# Patient Record
Sex: MALE | Race: OTHER | HISPANIC OR LATINO | URBAN - METROPOLITAN AREA
[De-identification: names, ages, dates, MRNs, and addresses within clinical notes are randomized per-mention and may not be internally consistent; named-entity substitution may affect disease eponyms.]

---

## 2020-03-13 ENCOUNTER — EMERGENCY (EMERGENCY)
Facility: HOSPITAL | Age: 20
LOS: 1 days | Discharge: HOME | End: 2020-03-13
Attending: EMERGENCY MEDICINE | Admitting: EMERGENCY MEDICINE
Payer: MEDICAID

## 2020-03-13 VITALS
TEMPERATURE: 98 F | OXYGEN SATURATION: 98 % | RESPIRATION RATE: 20 BRPM | DIASTOLIC BLOOD PRESSURE: 79 MMHG | HEART RATE: 89 BPM | SYSTOLIC BLOOD PRESSURE: 133 MMHG

## 2020-03-13 PROCEDURE — 99284 EMERGENCY DEPT VISIT MOD MDM: CPT

## 2020-03-13 NOTE — ED ADULT TRIAGE NOTE - CHIEF COMPLAINT QUOTE
"I am having pain to my right ankle x 2 days. pt is outpatient [facility]. pt denies injury, denies suicidal or homicidial thoughts, no visural or  hallucination

## 2020-03-14 VITALS
SYSTOLIC BLOOD PRESSURE: 133 MMHG | TEMPERATURE: 98 F | RESPIRATION RATE: 19 BRPM | DIASTOLIC BLOOD PRESSURE: 64 MMHG | HEART RATE: 80 BPM | OXYGEN SATURATION: 100 %

## 2020-03-14 LAB
BASOPHILS # BLD AUTO: 0.05 K/UL — SIGNIFICANT CHANGE UP (ref 0–0.2)
BASOPHILS NFR BLD AUTO: 0.5 % — SIGNIFICANT CHANGE UP (ref 0–2)
BLD GP AB SCN SERPL QL: NEGATIVE — SIGNIFICANT CHANGE UP
CK SERPL-CCNC: 138 U/L — SIGNIFICANT CHANGE UP (ref 30–200)
EOSINOPHIL # BLD AUTO: 0.32 K/UL — SIGNIFICANT CHANGE UP (ref 0–0.5)
EOSINOPHIL NFR BLD AUTO: 3.4 % — SIGNIFICANT CHANGE UP (ref 0–6)
HCT VFR BLD CALC: 37.7 % — LOW (ref 39–50)
HGB BLD-MCNC: 12.1 G/DL — LOW (ref 13–17)
IMM GRANULOCYTES NFR BLD AUTO: 0.2 % — SIGNIFICANT CHANGE UP (ref 0–1.5)
LITHIUM SERPL-MCNC: < 0.1 MMOL/L — LOW (ref 0.6–1.2)
LYMPHOCYTES # BLD AUTO: 3.03 K/UL — SIGNIFICANT CHANGE UP (ref 1–3.3)
LYMPHOCYTES # BLD AUTO: 32.5 % — SIGNIFICANT CHANGE UP (ref 13–44)
MCHC RBC-ENTMCNC: 26.2 PG — LOW (ref 27–34)
MCHC RBC-ENTMCNC: 32.1 % — SIGNIFICANT CHANGE UP (ref 32–36)
MCV RBC AUTO: 81.8 FL — SIGNIFICANT CHANGE UP (ref 80–100)
MONOCYTES # BLD AUTO: 0.54 K/UL — SIGNIFICANT CHANGE UP (ref 0–0.9)
MONOCYTES NFR BLD AUTO: 5.8 % — SIGNIFICANT CHANGE UP (ref 2–14)
NEUTROPHILS # BLD AUTO: 5.36 K/UL — SIGNIFICANT CHANGE UP (ref 1.8–7.4)
NEUTROPHILS NFR BLD AUTO: 57.6 % — SIGNIFICANT CHANGE UP (ref 43–77)
NRBC # FLD: 0 K/UL — SIGNIFICANT CHANGE UP (ref 0–0)
PLATELET # BLD AUTO: 327 K/UL — SIGNIFICANT CHANGE UP (ref 150–400)
PMV BLD: 9.8 FL — SIGNIFICANT CHANGE UP (ref 7–13)
RBC # BLD: 4.61 M/UL — SIGNIFICANT CHANGE UP (ref 4.2–5.8)
RBC # FLD: 15.2 % — HIGH (ref 10.3–14.5)
RH IG SCN BLD-IMP: POSITIVE — SIGNIFICANT CHANGE UP
TSH SERPL-MCNC: 3.58 UIU/ML — SIGNIFICANT CHANGE UP (ref 0.27–4.2)
WBC # BLD: 9.32 K/UL — SIGNIFICANT CHANGE UP (ref 3.8–10.5)
WBC # FLD AUTO: 9.32 K/UL — SIGNIFICANT CHANGE UP (ref 3.8–10.5)

## 2020-03-14 PROCEDURE — 73564 X-RAY EXAM KNEE 4 OR MORE: CPT | Mod: 26,RT

## 2020-03-14 PROCEDURE — 73590 X-RAY EXAM OF LOWER LEG: CPT | Mod: 26,RT

## 2020-03-14 PROCEDURE — 73610 X-RAY EXAM OF ANKLE: CPT | Mod: 26,RT

## 2020-03-14 PROCEDURE — 93971 EXTREMITY STUDY: CPT | Mod: 26,RT

## 2020-03-14 RX ORDER — KETOROLAC TROMETHAMINE 30 MG/ML
15 SYRINGE (ML) INJECTION ONCE
Refills: 0 | Status: DISCONTINUED | OUTPATIENT
Start: 2020-03-14 | End: 2020-03-14

## 2020-03-14 RX ORDER — IBUPROFEN 200 MG
1 TABLET ORAL
Qty: 20 | Refills: 0
Start: 2020-03-14 | End: 2020-03-18

## 2020-03-14 RX ORDER — OXYCODONE AND ACETAMINOPHEN 5; 325 MG/1; MG/1
1 TABLET ORAL ONCE
Refills: 0 | Status: DISCONTINUED | OUTPATIENT
Start: 2020-03-14 | End: 2020-03-14

## 2020-03-14 RX ADMIN — OXYCODONE AND ACETAMINOPHEN 1 TABLET(S): 5; 325 TABLET ORAL at 06:06

## 2020-03-14 RX ADMIN — Medication 15 MILLIGRAM(S): at 03:10

## 2020-03-14 RX ADMIN — Medication 15 MILLIGRAM(S): at 02:40

## 2020-03-14 RX ADMIN — OXYCODONE AND ACETAMINOPHEN 1 TABLET(S): 5; 325 TABLET ORAL at 04:59

## 2020-03-14 NOTE — ED PROVIDER NOTE - NSFOLLOWUPINSTRUCTIONS_ED_ALL_ED_FT
Please follow up with your primary care doctor after you leave the emergency department so that they can follow up and conduct more testing and treatment as they deem necessary. If you have worsening signs or symptoms of what you came in to the Emergency Department today and are not able to see your doctor, go to your nearest emergency department or return to the [facility] emergency department for further care and management.

## 2020-03-14 NOTE — ED ADULT NURSE REASSESSMENT NOTE - NS ED NURSE REASSESS COMMENT FT1
right ankle wrapped in ace bandage by MD Renee, and instructed on use of crutches. pt assisted to waiting room in wheelchair awaiting taxi back to residence.

## 2020-03-14 NOTE — ED ADULT NURSE REASSESSMENT NOTE - NS ED NURSE REASSESS COMMENT FT1
pt to be discharged. MD Renee obtained collateral to Jennifer at [facility] on outpt [place]. transport by ambulette to be arranged. pt to be discharged. MD Renee obtained collateral to Jennifer at [facility] on outpt [place]. transport to be arranged.

## 2020-03-14 NOTE — ED PROVIDER NOTE - PROGRESS NOTE DETAILS
ANGELES:  Patient still with pain, significant tenderness anterior leg on exam.  Will c/s ortho for compartment syndrome. BRIDGETTE:  Ortho consultant states that if no fracture, consult should be taken by vascular resident.  Case discussed with vascular surgery resident who will see patient. MUNIRA: I was signed out this pt pending Surgery consult and recs for r/o compartment syndrome and they do not think pt has compartment syndrome and they do not think pt requires any surgical intervention. Re-interview of pt, he appears well but does complaint of RLE pain, gradual onset since monday leading him to "hop" around due to pain, not improved with ice so came to ED, no pain meds taken for this. Was given toradol in ED, no improvement. Re-examination of leg shows no edema, no erythema, DP/PT pulses normal but tenderness to palpation in tib and calf on RLE, guarding, not allowing full exam. Skin warm but not HOT to touch. Will provide more PO pain meds and reassess. MUNIRA: Pt pain improved. wants to be discharged back to [facility]. Spoke to Jennifer at [facility], will discharge pt and send back via Cab. They are aware. Pt will be given Rx for motrin 400 mg Q6-8H PRN. pt aware to need to f/u with PMD in next few days. Given crutches and taught how to use them. Pt was offered admission but refused. wanted to go home. Return precautions explained.

## 2020-03-14 NOTE — ED PROVIDER NOTE - OBJECTIVE STATEMENT
20 yo M presents from [facility] with right leg pain.  Patient states pain is in anterior chin, started 5 days ago, worsening, made worse by weight bearing and walking.  Denies trauma or strenuous exercise.  Denies numbness, tingling, weakness, coldness.  Not taking anything for pain.  No other complaints.  No recent travel.  No history of dvt/pe.

## 2020-03-14 NOTE — ED ADULT NURSE NOTE - NSIMPLEMENTINTERV_GEN_ALL_ED
Implemented All Fall with Harm Risk Interventions:  [place] to call system. Call bell, personal items and telephone within reach. Instruct patient to call for assistance. Room bathroom lighting operational. Non-slip footwear when patient is off stretcher. Physically safe environment: no spills, clutter or unnecessary equipment. Stretcher in lowest position, wheels locked, appropriate side rails in place. Provide visual cue, wrist band, yellow gown, etc. Monitor gait and stability. Monitor for mental status changes and reorient to person, place, and time. Review medications for side effects contributing to fall risk. Reinforce activity limits and safety measures with patient and family. Provide visual clues: red socks.

## 2020-03-14 NOTE — ED PROVIDER NOTE - PATIENT PORTAL LINK FT
You can access the FollowMyHealth Patient Portal offered by [facility] by registering at the following website: [email or web address]. By joining [facility]’s FollowMyHealth portal, you will also be able to view your health information using other applications (apps) compatible with our system.

## 2020-03-14 NOTE — ED PROVIDER NOTE - PHYSICAL EXAMINATION
RLE:  No gross deformity or swelling.  Mildly tender knee and ankle.  Significant tenderness at anterior leg and thigh, worse when squeezing.  Able to range knee.  Limited ROM at ankle.  Can wiggle toes.  Pain with passive stretch at ankle and foot.  DP and PT pulses 2+.  Sensation grossly intact.  Foot is warm.

## 2020-03-14 NOTE — ED PROVIDER NOTE - SHIFT CHANGE DETAILS
BRIDGETTE:  Patient signed out to Dr. Renee pending surgery consult for evaluation of suspected compartment syndrome.  RLE of patient is neurovascularly intact, but patient with persistent pain.  HD stable at time of sign out.

## 2020-03-14 NOTE — ED ADULT NURSE REASSESSMENT NOTE - NS ED NURSE REASSESS COMMENT FT1
pt received from intake area, A&Ox4 calm and cooperative, endorses RLE pain. pt states he is unable to bear weight. pt seen by surgery, awaiting disposition.

## 2020-03-14 NOTE — CONSULT NOTE ADULT - ASSESSMENT
18 yo man with a hx of bipolar disorder, schizophrenia, seizure disorder, and anxiety presenting with a 6-day history of worsening RLE pain. Surgery consulted to rule out compartment syndrome. RLE compartments soft, tenderness to palpation of the right leg and right ankle, peripheral pulses intact. Labs including CK wnl. Plain films do not reveal fracture; LE duplex negative for DVT.    - low clinical suspicion for compartment syndrome  - no general surgical intervention indicated at this time  - further workup at discretion of ED    B Team Surgery  [id number] 18 yo man with a hx of bipolar disorder, schizophrenia, seizure disorder, and anxiety presenting with a 6-day history of worsening RLE pain. Surgery consulted to rule out compartment syndrome. RLE compartments soft, tenderness to palpation of the right leg and right ankle, peripheral pulses intact. Sensation and motor intact. Labs including CK wnl. Plain films do not reveal fracture; LE duplex negative for DVT.    - low clinical suspicion for compartment syndrome  - no general surgical intervention indicated at this time  - further workup at discretion of ED    B Team Surgery  [id number]

## 2020-03-14 NOTE — ED PROVIDER NOTE - CLINICAL SUMMARY MEDICAL DECISION MAKING FREE TEXT BOX
18 yo M presents from [facility] with right leg pain x5 days, atraumatic.  Exam non-toxic appearing, RLE neurovascularly intact but with significant tenderness in anterior leg and pain with passive stretch of distal appendage.  Low c/f septic joint.  Low c/f acute fracture given history.  Lower c/f dvt given lack of swelling and pain is presenting symptom.  Compartment syndrome considered, but no clear inciting event.  Will send labs, x-rays, duplex, pain control.   Dispo pending.

## 2020-03-16 ENCOUNTER — INPATIENT (INPATIENT)
Facility: HOSPITAL | Age: 20
LOS: 14 days | Discharge: HOME | End: 2020-03-31
Attending: PSYCHIATRY & NEUROLOGY | Admitting: PSYCHIATRY & NEUROLOGY
Payer: MEDICAID

## 2020-03-16 VITALS
HEART RATE: 93 BPM | DIASTOLIC BLOOD PRESSURE: 86 MMHG | RESPIRATION RATE: 18 BRPM | TEMPERATURE: 98 F | SYSTOLIC BLOOD PRESSURE: 137 MMHG | OXYGEN SATURATION: 98 %

## 2020-03-16 DIAGNOSIS — F25.1 SCHIZOAFFECTIVE DISORDER, DEPRESSIVE TYPE: ICD-10-CM

## 2020-03-16 LAB
ALBUMIN SERPL ELPH-MCNC: 4.6 G/DL — SIGNIFICANT CHANGE UP (ref 3.3–5)
ALP SERPL-CCNC: 86 U/L — SIGNIFICANT CHANGE UP (ref 40–120)
ALT FLD-CCNC: 53 U/L — HIGH (ref 4–41)
AMPHET UR-MCNC: NEGATIVE — SIGNIFICANT CHANGE UP
ANION GAP SERPL CALC-SCNC: 17 MMO/L — HIGH (ref 7–14)
APAP SERPL-MCNC: < 15 UG/ML — LOW (ref 15–25)
APPEARANCE UR: CLEAR — SIGNIFICANT CHANGE UP
AST SERPL-CCNC: 33 U/L — SIGNIFICANT CHANGE UP (ref 4–40)
BACTERIA # UR AUTO: NEGATIVE — SIGNIFICANT CHANGE UP
BARBITURATES UR SCN-MCNC: NEGATIVE — SIGNIFICANT CHANGE UP
BASOPHILS # BLD AUTO: 0.04 K/UL — SIGNIFICANT CHANGE UP (ref 0–0.2)
BASOPHILS NFR BLD AUTO: 0.4 % — SIGNIFICANT CHANGE UP (ref 0–2)
BENZODIAZ UR-MCNC: NEGATIVE — SIGNIFICANT CHANGE UP
BILIRUB SERPL-MCNC: 0.4 MG/DL — SIGNIFICANT CHANGE UP (ref 0.2–1.2)
BILIRUB UR-MCNC: NEGATIVE — SIGNIFICANT CHANGE UP
BLOOD UR QL VISUAL: NEGATIVE — SIGNIFICANT CHANGE UP
BUN SERPL-MCNC: 6 MG/DL — LOW (ref 7–23)
CALCIUM SERPL-MCNC: 10.1 MG/DL — SIGNIFICANT CHANGE UP (ref 8.4–10.5)
CANNABINOIDS UR-MCNC: NEGATIVE — SIGNIFICANT CHANGE UP
CHLORIDE SERPL-SCNC: 100 MMOL/L — SIGNIFICANT CHANGE UP (ref 98–107)
CO2 SERPL-SCNC: 23 MMOL/L — SIGNIFICANT CHANGE UP (ref 22–31)
COCAINE METAB.OTHER UR-MCNC: NEGATIVE — SIGNIFICANT CHANGE UP
COLOR SPEC: YELLOW — SIGNIFICANT CHANGE UP
CREAT SERPL-MCNC: 0.73 MG/DL — SIGNIFICANT CHANGE UP (ref 0.5–1.3)
EOSINOPHIL # BLD AUTO: 0.13 K/UL — SIGNIFICANT CHANGE UP (ref 0–0.5)
EOSINOPHIL NFR BLD AUTO: 1.4 % — SIGNIFICANT CHANGE UP (ref 0–6)
ETHANOL BLD-MCNC: < 10 MG/DL — SIGNIFICANT CHANGE UP
GLUCOSE SERPL-MCNC: 79 MG/DL — SIGNIFICANT CHANGE UP (ref 70–99)
GLUCOSE UR-MCNC: NEGATIVE — SIGNIFICANT CHANGE UP
HCT VFR BLD CALC: 40.9 % — SIGNIFICANT CHANGE UP (ref 39–50)
HGB BLD-MCNC: 13.3 G/DL — SIGNIFICANT CHANGE UP (ref 13–17)
HYALINE CASTS # UR AUTO: SIGNIFICANT CHANGE UP
IMM GRANULOCYTES NFR BLD AUTO: 0.3 % — SIGNIFICANT CHANGE UP (ref 0–1.5)
KETONES UR-MCNC: HIGH
LEUKOCYTE ESTERASE UR-ACNC: NEGATIVE — SIGNIFICANT CHANGE UP
LITHIUM SERPL-MCNC: < 0.04 MMOL/L — LOW (ref 0.6–1.2)
LYMPHOCYTES # BLD AUTO: 2.17 K/UL — SIGNIFICANT CHANGE UP (ref 1–3.3)
LYMPHOCYTES # BLD AUTO: 23.8 % — SIGNIFICANT CHANGE UP (ref 13–44)
MCHC RBC-ENTMCNC: 26.6 PG — LOW (ref 27–34)
MCHC RBC-ENTMCNC: 32.5 % — SIGNIFICANT CHANGE UP (ref 32–36)
MCV RBC AUTO: 81.8 FL — SIGNIFICANT CHANGE UP (ref 80–100)
METHADONE UR-MCNC: NEGATIVE — SIGNIFICANT CHANGE UP
MONOCYTES # BLD AUTO: 0.45 K/UL — SIGNIFICANT CHANGE UP (ref 0–0.9)
MONOCYTES NFR BLD AUTO: 4.9 % — SIGNIFICANT CHANGE UP (ref 2–14)
NEUTROPHILS # BLD AUTO: 6.31 K/UL — SIGNIFICANT CHANGE UP (ref 1.8–7.4)
NEUTROPHILS NFR BLD AUTO: 69.2 % — SIGNIFICANT CHANGE UP (ref 43–77)
NITRITE UR-MCNC: NEGATIVE — SIGNIFICANT CHANGE UP
NRBC # FLD: 0 K/UL — SIGNIFICANT CHANGE UP (ref 0–0)
OPIATES UR-MCNC: NEGATIVE — SIGNIFICANT CHANGE UP
OXYCODONE UR-MCNC: NEGATIVE — SIGNIFICANT CHANGE UP
PCP UR-MCNC: NEGATIVE — SIGNIFICANT CHANGE UP
PH UR: 7.5 — SIGNIFICANT CHANGE UP (ref 5–8)
PLATELET # BLD AUTO: 286 K/UL — SIGNIFICANT CHANGE UP (ref 150–400)
PMV BLD: 10.1 FL — SIGNIFICANT CHANGE UP (ref 7–13)
POTASSIUM SERPL-MCNC: 3.8 MMOL/L — SIGNIFICANT CHANGE UP (ref 3.5–5.3)
POTASSIUM SERPL-SCNC: 3.8 MMOL/L — SIGNIFICANT CHANGE UP (ref 3.5–5.3)
PROT SERPL-MCNC: 8.2 G/DL — SIGNIFICANT CHANGE UP (ref 6–8.3)
PROT UR-MCNC: 30 — SIGNIFICANT CHANGE UP
RBC # BLD: 5 M/UL — SIGNIFICANT CHANGE UP (ref 4.2–5.8)
RBC # FLD: 14.8 % — HIGH (ref 10.3–14.5)
RBC CASTS # UR COMP ASSIST: SIGNIFICANT CHANGE UP (ref 0–?)
SALICYLATES SERPL-MCNC: < 5 MG/DL — LOW (ref 15–30)
SODIUM SERPL-SCNC: 140 MMOL/L — SIGNIFICANT CHANGE UP (ref 135–145)
SP GR SPEC: 1.02 — SIGNIFICANT CHANGE UP (ref 1–1.04)
SQUAMOUS # UR AUTO: SIGNIFICANT CHANGE UP
TSH SERPL-MCNC: 1.27 UIU/ML — SIGNIFICANT CHANGE UP (ref 0.27–4.2)
UROBILINOGEN FLD QL: SIGNIFICANT CHANGE UP
WBC # BLD: 9.13 K/UL — SIGNIFICANT CHANGE UP (ref 3.8–10.5)
WBC # FLD AUTO: 9.13 K/UL — SIGNIFICANT CHANGE UP (ref 3.8–10.5)
WBC UR QL: SIGNIFICANT CHANGE UP (ref 0–?)

## 2020-03-16 PROCEDURE — 99285 EMERGENCY DEPT VISIT HI MDM: CPT

## 2020-03-16 RX ORDER — HALOPERIDOL DECANOATE 100 MG/ML
5 INJECTION INTRAMUSCULAR ONCE
Refills: 0 | Status: DISCONTINUED | OUTPATIENT
Start: 2020-03-16 | End: 2020-03-31

## 2020-03-16 RX ORDER — ACETAMINOPHEN 500 MG
650 TABLET ORAL ONCE
Refills: 0 | Status: COMPLETED | OUTPATIENT
Start: 2020-03-16 | End: 2020-03-16

## 2020-03-16 RX ORDER — FLUOXETINE HCL 10 MG
20 CAPSULE ORAL DAILY
Refills: 0 | Status: DISCONTINUED | OUTPATIENT
Start: 2020-03-17 | End: 2020-03-18

## 2020-03-16 RX ORDER — CLOZAPINE 150 MG/1
25 TABLET, ORALLY DISINTEGRATING ORAL AT BEDTIME
Refills: 0 | Status: DISCONTINUED | OUTPATIENT
Start: 2020-03-16 | End: 2020-03-19

## 2020-03-16 RX ORDER — LITHIUM CARBONATE 300 MG/1
300 TABLET, EXTENDED RELEASE ORAL
Refills: 0 | Status: DISCONTINUED | OUTPATIENT
Start: 2020-03-16 | End: 2020-03-18

## 2020-03-16 RX ORDER — HALOPERIDOL DECANOATE 100 MG/ML
5 INJECTION INTRAMUSCULAR EVERY 6 HOURS
Refills: 0 | Status: DISCONTINUED | OUTPATIENT
Start: 2020-03-16 | End: 2020-03-31

## 2020-03-16 RX ORDER — ACETAMINOPHEN 500 MG
650 TABLET ORAL EVERY 6 HOURS
Refills: 0 | Status: DISCONTINUED | OUTPATIENT
Start: 2020-03-16 | End: 2020-03-31

## 2020-03-16 RX ORDER — MAGNESIUM HYDROXIDE 400 MG/1
30 TABLET, CHEWABLE ORAL DAILY
Refills: 0 | Status: DISCONTINUED | OUTPATIENT
Start: 2020-03-16 | End: 2020-03-31

## 2020-03-16 RX ORDER — ALBUTEROL 90 UG/1
2 AEROSOL, METERED ORAL EVERY 6 HOURS
Refills: 0 | Status: DISCONTINUED | OUTPATIENT
Start: 2020-03-16 | End: 2020-03-31

## 2020-03-16 RX ORDER — SENNA PLUS 8.6 MG/1
2 TABLET ORAL AT BEDTIME
Refills: 0 | Status: DISCONTINUED | OUTPATIENT
Start: 2020-03-16 | End: 2020-03-31

## 2020-03-16 RX ADMIN — SENNA PLUS 2 TABLET(S): 8.6 TABLET ORAL at 20:43

## 2020-03-16 RX ADMIN — CLOZAPINE 25 MILLIGRAM(S): 150 TABLET, ORALLY DISINTEGRATING ORAL at 20:43

## 2020-03-16 RX ADMIN — LITHIUM CARBONATE 300 MILLIGRAM(S): 300 TABLET, EXTENDED RELEASE ORAL at 20:43

## 2020-03-16 RX ADMIN — Medication 650 MILLIGRAM(S): at 19:48

## 2020-03-16 NOTE — ED PROVIDER NOTE - PRIOR HOSPITAL/ED VISITS SUMMARY FREE TEXT FOR MDM OBTAINED AND REVIEWED OLD RECORDS QUESTION
Reviewed chart ed visit from 3/14 with sudden onset right ankle pain, - ankle, knee xray and US right leg all with negative results.  Pt arrived with an ACE band ot right leg. He states he was here 2 days ago for right ankle pain and he was discharged with negative results.
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
## 2020-03-16 NOTE — ED ADULT NURSE NOTE - OBJECTIVE STATEMENT
received to  area. c/o depression and hi with voices telling him to electrocute himself x 1 week. states has been non-compliant with meds x a few months. also c/o right ankle pain. denies hearing any voices now. labs sent. awaits psych eval in [facility].

## 2020-03-16 NOTE — ED BEHAVIORAL HEALTH ASSESSMENT NOTE - SUICIDE RISK FACTORS
Hopelessness or despair/Current mood episode/Command hallucinations/Unable to engage in safety planning

## 2020-03-16 NOTE — ED BEHAVIORAL HEALTH ASSESSMENT NOTE - RISK ASSESSMENT
High Acute Suicide Risk high risk High acute risk with risk factors of depressed mood, hopelessness, acute suicidal ideation with plan with noted past history of suicide attempts and past psychiatric hospitalizations.  Precipitating factors are recent non-adherance to medications since discharge with noted unstable living environment.  Protective factors are the fact patient is on a long acting injectable and is exhibiting help seeking behavior, voluntarily seeking inpatient treatment.

## 2020-03-16 NOTE — ED BEHAVIORAL HEALTH ASSESSMENT NOTE - OTHER
[facility] on [place] [address] peers at respite records from [facility] discharge summary Missy forde on 3/6/20 in bed [id number]

## 2020-03-16 NOTE — ED BEHAVIORAL HEALTH ASSESSMENT NOTE - CURRENT MEDICATION
Aristada 1064mg IM q 2 months (last given 2/28/2020, next dose due 4/24/20), Non compliant with PO meds listed on d/c 3/6/20 as fluoxetine 60mg daily, lithium carbonate 900mg HS, clozapine 150mg HS, docusate 100mg BID, Senna 2 tabs HS

## 2020-03-16 NOTE — ED BEHAVIORAL HEALTH ASSESSMENT NOTE - PRIMARY DX
Schizoaffective disorder, depressive type Schizoaffective disorder, unspecified type Deferred condition on axis II

## 2020-03-16 NOTE — ED PROVIDER NOTE - NS ED ROS FT
ROS  	•	CONSTITUTIONAL - no fever, no diaphoresis, no weight change  	•	SKIN - no rash  	•	HEMATOLOGIC - no bleeding, no bruising  	•	EYES - no eye pain, no blurred vision  	•	ENT - no change in hearing, no pain  	•	RESPIRATORY - no shortness of breath, no cough  	•	CARDIAC - no chest pain, no palpitations  	•	GI - no abd pain, no nausea, no vomiting, no diarrhea, no constipation, no bleeding  	•	GENITO-URINARY - no frequent urination, no urgency, no dysuria; no hematuria,    	•	ENDO - no polydypsia, no polyurea, no heat/no cold intolerance  	•	MUSCULOSKELETAL - +right ankle pain, unable to bear weight   	•	NEUROLOGIC - no weakness, no headache, no anesthesia, no paresthesias  	•	PSYCH - + anxiety, +depression +suicidal,+ AH,  no homicidal,

## 2020-03-16 NOTE — ED BEHAVIORAL HEALTH ASSESSMENT NOTE - PSYCHIATRIC ISSUES AND PLAN (INCLUDE STANDING AND PRN MEDICATION)
restart on clozapine 25mg HS and suggest titration to treat psychosis, prozac 20mg in AM for depression, haldol 5mg PO/IM prn agitation, ativan 1mg PO/IM prn anxiety/agitation restart on clozapine 25mg HS and suggest titration to treat psychosis, lithium 300mg BID for mood stabilization, prozac 20mg in AM for depression, haldol 5mg PO/IM prn agitation, ativan 1mg PO q6hr prn anxiety and ativan 2mg IM prn agitation

## 2020-03-16 NOTE — ED PROVIDER NOTE - CLINICAL SUMMARY MEDICAL DECISION MAKING FREE TEXT BOX
This is a 19 yr old M, pmh bipolar disorder with c/o SI with a plan. Blood work, ua tox serum, ekg - results unremarkable. Psych consult - recommendation inpatient treatment. Medication offered and accepted for right ankle pain. Recommendation ambulate with assist may use walker as needed and PRN motrin or tylenol for pain management.
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
## 2020-03-16 NOTE — ED BEHAVIORAL HEALTH ASSESSMENT NOTE - MEDICAL ISSUES AND PLAN (INCLUDE STANDING AND PRN MEDICATION)
medically cleared - ambulate with walker as uses crutches at home, tylenol prn pain, hgb A1c and lipid profile in AM medically cleared - ambulate with walker as uses crutches at home, tylenol prn pain, hgb A1c and lipid profile in AM, albuterol prn SOB/wheezing for asthma, senna 2 tablets HS for constipation.  MOM prn constipation and MVI daily for supplementation.

## 2020-03-16 NOTE — ED BEHAVIORAL HEALTH ASSESSMENT NOTE - ADDITIONAL DETAILS ALL
acute SI with plan to electrocute self, past overdose 1 year ago and self aborted attempt of cutting neck with knife that lead to [place] 2/21/20 admission.

## 2020-03-16 NOTE — ED ADULT NURSE REASSESSMENT NOTE - NS ED NURSE REASSESS COMMENT FT1
Pt ambulating independently using crutches that he came in with. Currently being transported to [facility] with security & ED tech.

## 2020-03-16 NOTE — ED PROVIDER NOTE - OBJECTIVE STATEMENT
This is a 19 yr old M, [facility] This is a 19 yr old M, pmh bipolar disorder with c/o SI with a plan. Pt came from [place] [id number]. He states yesterday he had an episode when he wanted to hurt himself with a stapler in his room. He says today the voices telling him to electrocute himself. He endorses recent psychiatric hospitalization in [place] for SI. Pt arrived with an ACE band ot right leg. He states he was here 2 days ago for right ankle pain and he was discharged with negative results.  Denies falling, punching or kicking any objects. Denies pain, SOB, fever, chills, chest and abdominal discomfort. Denies recent use of alcohol or illicit drug. No evidence of physical injuries.
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
## 2020-03-16 NOTE — ED BEHAVIORAL HEALTH NOTE - BEHAVIORAL HEALTH NOTE
Pt is 19 year old male BIB EMS from [facility] for suicidal thoughts. Writer contacted [facility] at [phone] and spoke with , Dheeraj, who provided the following information:    Pt d/[facility] from [facility] to [facility] on 3/6/20. Pt's respite stay ends on 3/20/20. Pt reported to have hx of Schizoaffective disorder. Pt in last week has appeared in depressed state with increased sleep.   Pt today spoke with  and verbalized feeling depressed and wanting to harm himself with a stapler. Pt said voices are telling him plan. Pt then requested to go to hospital due to ideations. Medication list sent with pt. Pt received Aristada 1064MG IM q 2 months with next dose due 4/24/20 in addition to oral medications. IM medication reported to be new with pt reported to be still adjusting. Pt prior to most recent hospitalization had stopped medication ending in psych. hospitalization. Pt planning to go to shelter after respite stay due to stressors at home. Pt previously living with aunt.  [facility] does not manage or supervise medications. Current eating patterns unknown. This was first time pt reported SI to staff. Pt however reported that thoughts first started yesterday. Pt has no access to firearms. No observed response to internal stimuli was reported. Pt has no hx of aggression or violence. No substance use reported. Pt sent from respite program to ED the other day for leg pain and d/[facility]ivier with dx of Arthritis. NO HI intent or plan reported. Pt is 19 year old male BIB EMS from [facility] for suicidal thoughts. Writer contacted [facility] at [phone] and spoke with , Dheeraj, who provided the following information:    Pt d/[facility] from [facility] to [facility] on 3/6/20. Pt's respite stay ends on 3/20/20. Pt reported to have hx of Schizoaffective disorder. Pt in last week has appeared in depressed state with increased sleep.   Pt today spoke with  and verbalized feeling depressed and wanting to harm himself with a stapler. Pt said voices are telling him plan. Pt then requested to go to hospital due to ideations. Medication list sent with pt. Pt received Aristada 1064MG IM q 2 months with next dose due 4/24/20 in addition to oral medications. IM medication reported to be new with pt reported to be still adjusting. Pt prior to most recent hospitalization had stopped medication ending in psych. hospitalization. Pt planning to go to shelter after respite stay due to stressors at home. Pt previously living with aunt.  [facility] does not manage or supervise medications. Current eating patterns unknown. This was first time pt reported SI to staff. Pt however reported that thoughts first started yesterday. Pt has no access to firearms. No observed response to internal stimuli was reported. Pt has no hx of aggression or violence. No substance use reported. Pt sent from [facility] program to ED the other day for leg pain and d/[facility] with dx of Arthritis. NO HI intent or plan reported.    [facility].

## 2020-03-16 NOTE — ED BEHAVIORAL HEALTH ASSESSMENT NOTE - DESCRIPTION
calm, cooperative, no agitation, in behavioral control, no prns    Vital Signs Last 24 Hrs  T(C): 36.7 (16 Mar 2020 15:54), Max: 36.7 (16 Mar 2020 15:54)  T(F): 98 (16 Mar 2020 15:54), Max: 98 (16 Mar 2020 15:54)  HR: 93 (16 Mar 2020 15:54) (93 - 93)  BP: 137/86 (16 Mar 2020 15:54) (137/86 - 137/86)  BP(mean): --  RR: 18 (16 Mar 2020 15:54) (18 - 18)  SpO2: 98% (16 Mar 2020 15:54) (98% - 98%) right ankle pain, asthma, prediabetic, obesity see HPI
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
## 2020-03-17 DIAGNOSIS — R69 ILLNESS, UNSPECIFIED: ICD-10-CM

## 2020-03-17 DIAGNOSIS — F25.9 SCHIZOAFFECTIVE DISORDER, UNSPECIFIED: ICD-10-CM

## 2020-03-17 LAB
CHOLEST SERPL-MCNC: 173 MG/DL — SIGNIFICANT CHANGE UP (ref 120–199)
HBA1C BLD-MCNC: 4.7 % — SIGNIFICANT CHANGE UP (ref 4–5.6)
HDLC SERPL-MCNC: 21 MG/DL — LOW (ref 35–55)
LIPID PNL WITH DIRECT LDL SERPL: 119 MG/DL — SIGNIFICANT CHANGE UP
TRIGL SERPL-MCNC: 196 MG/DL — HIGH (ref 10–149)

## 2020-03-17 PROCEDURE — 99222 1ST HOSP IP/OBS MODERATE 55: CPT

## 2020-03-17 RX ADMIN — Medication 20 MILLIGRAM(S): at 08:30

## 2020-03-17 RX ADMIN — Medication 1 TABLET(S): at 08:30

## 2020-03-17 RX ADMIN — LITHIUM CARBONATE 300 MILLIGRAM(S): 300 TABLET, EXTENDED RELEASE ORAL at 20:29

## 2020-03-17 RX ADMIN — CLOZAPINE 25 MILLIGRAM(S): 150 TABLET, ORALLY DISINTEGRATING ORAL at 20:29

## 2020-03-17 RX ADMIN — SENNA PLUS 2 TABLET(S): 8.6 TABLET ORAL at 20:29

## 2020-03-17 RX ADMIN — LITHIUM CARBONATE 300 MILLIGRAM(S): 300 TABLET, EXTENDED RELEASE ORAL at 08:30

## 2020-03-18 PROCEDURE — 99232 SBSQ HOSP IP/OBS MODERATE 35: CPT

## 2020-03-18 RX ADMIN — SENNA PLUS 2 TABLET(S): 8.6 TABLET ORAL at 20:42

## 2020-03-18 RX ADMIN — Medication 20 MILLIGRAM(S): at 08:38

## 2020-03-18 RX ADMIN — Medication 1 TABLET(S): at 08:39

## 2020-03-18 RX ADMIN — CLOZAPINE 25 MILLIGRAM(S): 150 TABLET, ORALLY DISINTEGRATING ORAL at 20:42

## 2020-03-18 RX ADMIN — LITHIUM CARBONATE 300 MILLIGRAM(S): 300 TABLET, EXTENDED RELEASE ORAL at 08:38

## 2020-03-19 PROCEDURE — 99232 SBSQ HOSP IP/OBS MODERATE 35: CPT

## 2020-03-19 RX ORDER — CLOZAPINE 150 MG/1
50 TABLET, ORALLY DISINTEGRATING ORAL AT BEDTIME
Refills: 0 | Status: DISCONTINUED | OUTPATIENT
Start: 2020-03-19 | End: 2020-03-21

## 2020-03-19 RX ADMIN — CLOZAPINE 50 MILLIGRAM(S): 150 TABLET, ORALLY DISINTEGRATING ORAL at 20:31

## 2020-03-19 RX ADMIN — SENNA PLUS 2 TABLET(S): 8.6 TABLET ORAL at 20:31

## 2020-03-20 PROCEDURE — 99232 SBSQ HOSP IP/OBS MODERATE 35: CPT

## 2020-03-20 RX ADMIN — CLOZAPINE 50 MILLIGRAM(S): 150 TABLET, ORALLY DISINTEGRATING ORAL at 21:31

## 2020-03-20 RX ADMIN — SENNA PLUS 2 TABLET(S): 8.6 TABLET ORAL at 20:14

## 2020-03-20 RX ADMIN — Medication 1 TABLET(S): at 12:00

## 2020-03-21 PROCEDURE — 99231 SBSQ HOSP IP/OBS SF/LOW 25: CPT

## 2020-03-21 RX ORDER — CLOZAPINE 150 MG/1
75 TABLET, ORALLY DISINTEGRATING ORAL AT BEDTIME
Refills: 0 | Status: DISCONTINUED | OUTPATIENT
Start: 2020-03-21 | End: 2020-03-24

## 2020-03-21 RX ADMIN — CLOZAPINE 75 MILLIGRAM(S): 150 TABLET, ORALLY DISINTEGRATING ORAL at 20:28

## 2020-03-21 RX ADMIN — Medication 1 MILLIGRAM(S): at 18:30

## 2020-03-21 RX ADMIN — SENNA PLUS 2 TABLET(S): 8.6 TABLET ORAL at 20:28

## 2020-03-21 RX ADMIN — Medication 1 TABLET(S): at 12:32

## 2020-03-21 RX ADMIN — Medication 650 MILLIGRAM(S): at 13:47

## 2020-03-21 RX ADMIN — HALOPERIDOL DECANOATE 5 MILLIGRAM(S): 100 INJECTION INTRAMUSCULAR at 18:30

## 2020-03-22 PROCEDURE — 99232 SBSQ HOSP IP/OBS MODERATE 35: CPT

## 2020-03-22 RX ADMIN — CLOZAPINE 75 MILLIGRAM(S): 150 TABLET, ORALLY DISINTEGRATING ORAL at 20:29

## 2020-03-22 RX ADMIN — Medication 1 TABLET(S): at 12:53

## 2020-03-22 RX ADMIN — SENNA PLUS 2 TABLET(S): 8.6 TABLET ORAL at 20:29

## 2020-03-23 PROCEDURE — 99231 SBSQ HOSP IP/OBS SF/LOW 25: CPT

## 2020-03-23 RX ORDER — BENZOCAINE AND MENTHOL 5; 1 G/100ML; G/100ML
1 LIQUID ORAL
Refills: 0 | Status: DISCONTINUED | OUTPATIENT
Start: 2020-03-23 | End: 2020-03-31

## 2020-03-23 RX ADMIN — Medication 1 TABLET(S): at 10:35

## 2020-03-23 RX ADMIN — BENZOCAINE AND MENTHOL 1 LOZENGE: 5; 1 LIQUID ORAL at 12:59

## 2020-03-23 RX ADMIN — CLOZAPINE 75 MILLIGRAM(S): 150 TABLET, ORALLY DISINTEGRATING ORAL at 20:27

## 2020-03-23 RX ADMIN — SENNA PLUS 2 TABLET(S): 8.6 TABLET ORAL at 20:27

## 2020-03-24 LAB
ALBUMIN SERPL ELPH-MCNC: 3.9 G/DL — SIGNIFICANT CHANGE UP (ref 3.3–5)
ALP SERPL-CCNC: 72 U/L — SIGNIFICANT CHANGE UP (ref 40–120)
ALT FLD-CCNC: 38 U/L — SIGNIFICANT CHANGE UP (ref 4–41)
ANION GAP SERPL CALC-SCNC: 12 MMO/L — SIGNIFICANT CHANGE UP (ref 7–14)
AST SERPL-CCNC: 23 U/L — SIGNIFICANT CHANGE UP (ref 4–40)
BASOPHILS # BLD AUTO: 0.03 K/UL — SIGNIFICANT CHANGE UP (ref 0–0.2)
BASOPHILS NFR BLD AUTO: 0.4 % — SIGNIFICANT CHANGE UP (ref 0–2)
BILIRUB SERPL-MCNC: 0.3 MG/DL — SIGNIFICANT CHANGE UP (ref 0.2–1.2)
BUN SERPL-MCNC: 8 MG/DL — SIGNIFICANT CHANGE UP (ref 7–23)
CALCIUM SERPL-MCNC: 9.6 MG/DL — SIGNIFICANT CHANGE UP (ref 8.4–10.5)
CHLORIDE SERPL-SCNC: 101 MMOL/L — SIGNIFICANT CHANGE UP (ref 98–107)
CO2 SERPL-SCNC: 25 MMOL/L — SIGNIFICANT CHANGE UP (ref 22–31)
CREAT SERPL-MCNC: 0.75 MG/DL — SIGNIFICANT CHANGE UP (ref 0.5–1.3)
EOSINOPHIL # BLD AUTO: 0.36 K/UL — SIGNIFICANT CHANGE UP (ref 0–0.5)
EOSINOPHIL NFR BLD AUTO: 4.2 % — SIGNIFICANT CHANGE UP (ref 0–6)
GLUCOSE SERPL-MCNC: 86 MG/DL — SIGNIFICANT CHANGE UP (ref 70–99)
HCT VFR BLD CALC: 39.4 % — SIGNIFICANT CHANGE UP (ref 39–50)
HGB BLD-MCNC: 12.1 G/DL — LOW (ref 13–17)
IMM GRANULOCYTES NFR BLD AUTO: 0.4 % — SIGNIFICANT CHANGE UP (ref 0–1.5)
LYMPHOCYTES # BLD AUTO: 1.58 K/UL — SIGNIFICANT CHANGE UP (ref 1–3.3)
LYMPHOCYTES # BLD AUTO: 18.4 % — SIGNIFICANT CHANGE UP (ref 13–44)
MCHC RBC-ENTMCNC: 25 PG — LOW (ref 27–34)
MCHC RBC-ENTMCNC: 30.7 % — LOW (ref 32–36)
MCV RBC AUTO: 81.4 FL — SIGNIFICANT CHANGE UP (ref 80–100)
MONOCYTES # BLD AUTO: 0.64 K/UL — SIGNIFICANT CHANGE UP (ref 0–0.9)
MONOCYTES NFR BLD AUTO: 7.5 % — SIGNIFICANT CHANGE UP (ref 2–14)
NEUTROPHILS # BLD AUTO: 5.93 K/UL — SIGNIFICANT CHANGE UP (ref 1.8–7.4)
NEUTROPHILS NFR BLD AUTO: 69.1 % — SIGNIFICANT CHANGE UP (ref 43–77)
NRBC # FLD: 0 K/UL — SIGNIFICANT CHANGE UP (ref 0–0)
PLATELET # BLD AUTO: 303 K/UL — SIGNIFICANT CHANGE UP (ref 150–400)
PMV BLD: 10.3 FL — SIGNIFICANT CHANGE UP (ref 7–13)
POTASSIUM SERPL-MCNC: 4.2 MMOL/L — SIGNIFICANT CHANGE UP (ref 3.5–5.3)
POTASSIUM SERPL-SCNC: 4.2 MMOL/L — SIGNIFICANT CHANGE UP (ref 3.5–5.3)
PROT SERPL-MCNC: 7.3 G/DL — SIGNIFICANT CHANGE UP (ref 6–8.3)
RBC # BLD: 4.84 M/UL — SIGNIFICANT CHANGE UP (ref 4.2–5.8)
RBC # FLD: 15.1 % — HIGH (ref 10.3–14.5)
SODIUM SERPL-SCNC: 138 MMOL/L — SIGNIFICANT CHANGE UP (ref 135–145)
WBC # BLD: 8.57 K/UL — SIGNIFICANT CHANGE UP (ref 3.8–10.5)
WBC # FLD AUTO: 8.57 K/UL — SIGNIFICANT CHANGE UP (ref 3.8–10.5)

## 2020-03-24 PROCEDURE — 99232 SBSQ HOSP IP/OBS MODERATE 35: CPT

## 2020-03-24 RX ORDER — CLOZAPINE 150 MG/1
100 TABLET, ORALLY DISINTEGRATING ORAL AT BEDTIME
Refills: 0 | Status: DISCONTINUED | OUTPATIENT
Start: 2020-03-24 | End: 2020-03-27

## 2020-03-24 RX ADMIN — HALOPERIDOL DECANOATE 5 MILLIGRAM(S): 100 INJECTION INTRAMUSCULAR at 15:57

## 2020-03-24 RX ADMIN — CLOZAPINE 100 MILLIGRAM(S): 150 TABLET, ORALLY DISINTEGRATING ORAL at 20:06

## 2020-03-24 RX ADMIN — BENZOCAINE AND MENTHOL 1 LOZENGE: 5; 1 LIQUID ORAL at 20:06

## 2020-03-24 RX ADMIN — Medication 1 TABLET(S): at 11:37

## 2020-03-24 RX ADMIN — SENNA PLUS 2 TABLET(S): 8.6 TABLET ORAL at 20:06

## 2020-03-25 PROCEDURE — 90853 GROUP PSYCHOTHERAPY: CPT

## 2020-03-25 PROCEDURE — 90832 PSYTX W PT 30 MINUTES: CPT | Mod: 59

## 2020-03-25 RX ADMIN — SENNA PLUS 2 TABLET(S): 8.6 TABLET ORAL at 20:36

## 2020-03-25 RX ADMIN — Medication 1 TABLET(S): at 08:35

## 2020-03-25 RX ADMIN — CLOZAPINE 100 MILLIGRAM(S): 150 TABLET, ORALLY DISINTEGRATING ORAL at 20:36

## 2020-03-26 PROCEDURE — 90832 PSYTX W PT 30 MINUTES: CPT

## 2020-03-26 RX ADMIN — Medication 1 TABLET(S): at 09:08

## 2020-03-26 RX ADMIN — BENZOCAINE AND MENTHOL 1 LOZENGE: 5; 1 LIQUID ORAL at 21:00

## 2020-03-26 RX ADMIN — CLOZAPINE 100 MILLIGRAM(S): 150 TABLET, ORALLY DISINTEGRATING ORAL at 21:08

## 2020-03-26 RX ADMIN — SENNA PLUS 2 TABLET(S): 8.6 TABLET ORAL at 20:10

## 2020-03-27 RX ORDER — CLOZAPINE 150 MG/1
125 TABLET, ORALLY DISINTEGRATING ORAL AT BEDTIME
Refills: 0 | Status: DISCONTINUED | OUTPATIENT
Start: 2020-03-27 | End: 2020-03-30

## 2020-03-27 RX ADMIN — CLOZAPINE 125 MILLIGRAM(S): 150 TABLET, ORALLY DISINTEGRATING ORAL at 21:15

## 2020-03-27 RX ADMIN — BENZOCAINE AND MENTHOL 1 LOZENGE: 5; 1 LIQUID ORAL at 21:15

## 2020-03-27 RX ADMIN — Medication 1 TABLET(S): at 13:03

## 2020-03-27 RX ADMIN — SENNA PLUS 2 TABLET(S): 8.6 TABLET ORAL at 20:10

## 2020-03-28 RX ADMIN — Medication 1 TABLET(S): at 09:07

## 2020-03-28 RX ADMIN — SENNA PLUS 2 TABLET(S): 8.6 TABLET ORAL at 20:13

## 2020-03-28 RX ADMIN — CLOZAPINE 125 MILLIGRAM(S): 150 TABLET, ORALLY DISINTEGRATING ORAL at 20:13

## 2020-03-29 RX ADMIN — Medication 1 TABLET(S): at 09:18

## 2020-03-29 RX ADMIN — SENNA PLUS 2 TABLET(S): 8.6 TABLET ORAL at 20:13

## 2020-03-29 RX ADMIN — CLOZAPINE 125 MILLIGRAM(S): 150 TABLET, ORALLY DISINTEGRATING ORAL at 20:13

## 2020-03-30 PROCEDURE — 90832 PSYTX W PT 30 MINUTES: CPT

## 2020-03-30 PROCEDURE — 99231 SBSQ HOSP IP/OBS SF/LOW 25: CPT

## 2020-03-30 RX ORDER — SENNA PLUS 8.6 MG/1
2 TABLET ORAL
Qty: 30 | Refills: 0
Start: 2020-03-30 | End: 2020-04-13

## 2020-03-30 RX ORDER — CLOZAPINE 150 MG/1
3 TABLET, ORALLY DISINTEGRATING ORAL
Qty: 21 | Refills: 0
Start: 2020-03-30 | End: 2020-04-05

## 2020-03-30 RX ORDER — CLOZAPINE 150 MG/1
150 TABLET, ORALLY DISINTEGRATING ORAL AT BEDTIME
Refills: 0 | Status: DISCONTINUED | OUTPATIENT
Start: 2020-03-30 | End: 2020-03-31

## 2020-03-30 RX ORDER — ALBUTEROL 90 UG/1
2 AEROSOL, METERED ORAL
Qty: 1 | Refills: 0
Start: 2020-03-30

## 2020-03-30 RX ADMIN — Medication 1 TABLET(S): at 08:32

## 2020-03-30 RX ADMIN — CLOZAPINE 150 MILLIGRAM(S): 150 TABLET, ORALLY DISINTEGRATING ORAL at 20:24

## 2020-03-30 RX ADMIN — SENNA PLUS 2 TABLET(S): 8.6 TABLET ORAL at 20:24

## 2020-03-31 VITALS — DIASTOLIC BLOOD PRESSURE: 68 MMHG | SYSTOLIC BLOOD PRESSURE: 126 MMHG | TEMPERATURE: 98 F | HEART RATE: 103 BPM

## 2020-03-31 LAB
ALBUMIN SERPL ELPH-MCNC: 4.4 G/DL — SIGNIFICANT CHANGE UP (ref 3.3–5)
ALP SERPL-CCNC: 78 U/L — SIGNIFICANT CHANGE UP (ref 40–120)
ALT FLD-CCNC: 61 U/L — HIGH (ref 4–41)
ANION GAP SERPL CALC-SCNC: 13 MMO/L — SIGNIFICANT CHANGE UP (ref 7–14)
AST SERPL-CCNC: 34 U/L — SIGNIFICANT CHANGE UP (ref 4–40)
BASOPHILS # BLD AUTO: 0.06 K/UL — SIGNIFICANT CHANGE UP (ref 0–0.2)
BASOPHILS NFR BLD AUTO: 0.8 % — SIGNIFICANT CHANGE UP (ref 0–2)
BILIRUB SERPL-MCNC: < 0.2 MG/DL — LOW (ref 0.2–1.2)
BUN SERPL-MCNC: 10 MG/DL — SIGNIFICANT CHANGE UP (ref 7–23)
CALCIUM SERPL-MCNC: 10 MG/DL — SIGNIFICANT CHANGE UP (ref 8.4–10.5)
CHLORIDE SERPL-SCNC: 102 MMOL/L — SIGNIFICANT CHANGE UP (ref 98–107)
CO2 SERPL-SCNC: 24 MMOL/L — SIGNIFICANT CHANGE UP (ref 22–31)
CREAT SERPL-MCNC: 0.69 MG/DL — SIGNIFICANT CHANGE UP (ref 0.5–1.3)
EOSINOPHIL # BLD AUTO: 0.36 K/UL — SIGNIFICANT CHANGE UP (ref 0–0.5)
EOSINOPHIL NFR BLD AUTO: 4.6 % — SIGNIFICANT CHANGE UP (ref 0–6)
GLUCOSE SERPL-MCNC: 81 MG/DL — SIGNIFICANT CHANGE UP (ref 70–99)
HCT VFR BLD CALC: 40.1 % — SIGNIFICANT CHANGE UP (ref 39–50)
HGB BLD-MCNC: 12.7 G/DL — LOW (ref 13–17)
IMM GRANULOCYTES NFR BLD AUTO: 0.4 % — SIGNIFICANT CHANGE UP (ref 0–1.5)
LYMPHOCYTES # BLD AUTO: 2.81 K/UL — SIGNIFICANT CHANGE UP (ref 1–3.3)
LYMPHOCYTES # BLD AUTO: 35.6 % — SIGNIFICANT CHANGE UP (ref 13–44)
MCHC RBC-ENTMCNC: 25.8 PG — LOW (ref 27–34)
MCHC RBC-ENTMCNC: 31.7 % — LOW (ref 32–36)
MCV RBC AUTO: 81.3 FL — SIGNIFICANT CHANGE UP (ref 80–100)
MONOCYTES # BLD AUTO: 0.62 K/UL — SIGNIFICANT CHANGE UP (ref 0–0.9)
MONOCYTES NFR BLD AUTO: 7.9 % — SIGNIFICANT CHANGE UP (ref 2–14)
NEUTROPHILS # BLD AUTO: 4.01 K/UL — SIGNIFICANT CHANGE UP (ref 1.8–7.4)
NEUTROPHILS NFR BLD AUTO: 50.7 % — SIGNIFICANT CHANGE UP (ref 43–77)
NRBC # FLD: 0 K/UL — SIGNIFICANT CHANGE UP (ref 0–0)
PLATELET # BLD AUTO: 349 K/UL — SIGNIFICANT CHANGE UP (ref 150–400)
PMV BLD: 9.8 FL — SIGNIFICANT CHANGE UP (ref 7–13)
POTASSIUM SERPL-MCNC: 4 MMOL/L — SIGNIFICANT CHANGE UP (ref 3.5–5.3)
POTASSIUM SERPL-SCNC: 4 MMOL/L — SIGNIFICANT CHANGE UP (ref 3.5–5.3)
PROT SERPL-MCNC: 7.6 G/DL — SIGNIFICANT CHANGE UP (ref 6–8.3)
RBC # BLD: 4.93 M/UL — SIGNIFICANT CHANGE UP (ref 4.2–5.8)
RBC # FLD: 14.8 % — HIGH (ref 10.3–14.5)
SODIUM SERPL-SCNC: 139 MMOL/L — SIGNIFICANT CHANGE UP (ref 135–145)
WBC # BLD: 7.89 K/UL — SIGNIFICANT CHANGE UP (ref 3.8–10.5)
WBC # FLD AUTO: 7.89 K/UL — SIGNIFICANT CHANGE UP (ref 3.8–10.5)

## 2020-03-31 PROCEDURE — 99238 HOSP IP/OBS DSCHRG MGMT 30/<: CPT

## 2020-03-31 RX ADMIN — Medication 650 MILLIGRAM(S): at 13:38

## 2020-03-31 RX ADMIN — Medication 1 TABLET(S): at 08:49

## 2022-07-08 ENCOUNTER — EMERGENCY (EMERGENCY)
Facility: HOSPITAL | Age: 22
LOS: 1 days | Discharge: HOME | End: 2022-07-08
Attending: STUDENT IN AN ORGANIZED HEALTH CARE EDUCATION/TRAINING PROGRAM | Admitting: STUDENT IN AN ORGANIZED HEALTH CARE EDUCATION/TRAINING PROGRAM
Payer: COMMERCIAL

## 2022-07-08 VITALS
TEMPERATURE: 99 F | SYSTOLIC BLOOD PRESSURE: 147 MMHG | DIASTOLIC BLOOD PRESSURE: 105 MMHG | HEART RATE: 92 BPM | HEIGHT: 68 IN | OXYGEN SATURATION: 100 % | RESPIRATION RATE: 18 BRPM

## 2022-07-08 DIAGNOSIS — R45.84 ANHEDONIA: ICD-10-CM

## 2022-07-08 DIAGNOSIS — Z91.010 ALLERGY TO PEANUTS: ICD-10-CM

## 2022-07-08 DIAGNOSIS — F32.A DEPRESSION, UNSPECIFIED: ICD-10-CM

## 2022-07-08 DIAGNOSIS — Z56.0 UNEMPLOYMENT, UNSPECIFIED: ICD-10-CM

## 2022-07-08 DIAGNOSIS — R45.6 VIOLENT BEHAVIOR: ICD-10-CM

## 2022-07-08 DIAGNOSIS — F41.9 ANXIETY DISORDER, UNSPECIFIED: ICD-10-CM

## 2022-07-08 DIAGNOSIS — R45.850 HOMICIDAL IDEATIONS: ICD-10-CM

## 2022-07-08 DIAGNOSIS — Z72.89 OTHER PROBLEMS RELATED TO LIFESTYLE: ICD-10-CM

## 2022-07-08 DIAGNOSIS — R45.851 SUICIDAL IDEATIONS: ICD-10-CM

## 2022-07-08 DIAGNOSIS — F12.99 CANNABIS USE, UNSPECIFIED WITH UNSPECIFIED CANNABIS-INDUCED DISORDER: ICD-10-CM

## 2022-07-08 DIAGNOSIS — G47.00 INSOMNIA, UNSPECIFIED: ICD-10-CM

## 2022-07-08 DIAGNOSIS — F25.0 SCHIZOAFFECTIVE DISORDER, BIPOLAR TYPE: ICD-10-CM

## 2022-07-08 DIAGNOSIS — Z20.822 CONTACT WITH AND (SUSPECTED) EXPOSURE TO COVID-19: ICD-10-CM

## 2022-07-08 DIAGNOSIS — F29 UNSPECIFIED PSYCHOSIS NOT DUE TO A SUBSTANCE OR KNOWN PHYSIOLOGICAL CONDITION: ICD-10-CM

## 2022-07-08 PROCEDURE — 99285 EMERGENCY DEPT VISIT HI MDM: CPT

## 2022-07-08 SDOH — ECONOMIC STABILITY - INCOME SECURITY: UNEMPLOYMENT, UNSPECIFIED: Z56.0

## 2022-07-08 NOTE — ED ADULT TRIAGE NOTE - ARRIVAL INFO ADDITIONAL COMMENTS
Pt reports SI/HI starting approximately 20 min ago when patient was in bed. HX of Bipoler disorder and schizoaffective disorder. Takes Lithium. Reports plan to stab himself and others. Denies Auditory nor visual hallucinations.

## 2022-07-09 VITALS
TEMPERATURE: 98 F | SYSTOLIC BLOOD PRESSURE: 117 MMHG | HEART RATE: 79 BPM | DIASTOLIC BLOOD PRESSURE: 71 MMHG | RESPIRATION RATE: 16 BRPM | OXYGEN SATURATION: 99 %

## 2022-07-09 DIAGNOSIS — F25.9 SCHIZOAFFECTIVE DISORDER, UNSPECIFIED: ICD-10-CM

## 2022-07-09 PROBLEM — F31.9 BIPOLAR DISORDER, UNSPECIFIED: Chronic | Status: ACTIVE | Noted: 2020-03-16

## 2022-07-09 LAB
ALBUMIN SERPL ELPH-MCNC: 4 G/DL — SIGNIFICANT CHANGE UP (ref 3.3–5)
ALP SERPL-CCNC: 83 U/L — SIGNIFICANT CHANGE UP (ref 40–120)
ALT FLD-CCNC: 36 U/L — SIGNIFICANT CHANGE UP (ref 10–45)
AMPHET UR-MCNC: NEGATIVE — SIGNIFICANT CHANGE UP
ANION GAP SERPL CALC-SCNC: 11 MMOL/L — SIGNIFICANT CHANGE UP (ref 5–17)
APAP SERPL-MCNC: <5 UG/ML — LOW (ref 10–30)
AST SERPL-CCNC: 24 U/L — SIGNIFICANT CHANGE UP (ref 10–40)
BARBITURATES UR SCN-MCNC: NEGATIVE — SIGNIFICANT CHANGE UP
BASOPHILS # BLD AUTO: 0.05 K/UL — SIGNIFICANT CHANGE UP (ref 0–0.2)
BASOPHILS NFR BLD AUTO: 0.5 % — SIGNIFICANT CHANGE UP (ref 0–2)
BENZODIAZ UR-MCNC: NEGATIVE — SIGNIFICANT CHANGE UP
BILIRUB SERPL-MCNC: 0.2 MG/DL — SIGNIFICANT CHANGE UP (ref 0.2–1.2)
BUN SERPL-MCNC: 6 MG/DL — LOW (ref 7–23)
CALCIUM SERPL-MCNC: 9.4 MG/DL — SIGNIFICANT CHANGE UP (ref 8.4–10.5)
CHLORIDE SERPL-SCNC: 103 MMOL/L — SIGNIFICANT CHANGE UP (ref 96–108)
CO2 SERPL-SCNC: 25 MMOL/L — SIGNIFICANT CHANGE UP (ref 22–31)
COCAINE METAB.OTHER UR-MCNC: NEGATIVE — SIGNIFICANT CHANGE UP
CREAT SERPL-MCNC: 0.7 MG/DL — SIGNIFICANT CHANGE UP (ref 0.5–1.3)
EGFR: 133 ML/MIN/1.73M2 — SIGNIFICANT CHANGE UP
EOSINOPHIL # BLD AUTO: 0.25 K/UL — SIGNIFICANT CHANGE UP (ref 0–0.5)
EOSINOPHIL NFR BLD AUTO: 2.7 % — SIGNIFICANT CHANGE UP (ref 0–6)
ETHANOL SERPL-MCNC: <10 MG/DL — SIGNIFICANT CHANGE UP (ref 0–10)
GLUCOSE SERPL-MCNC: 93 MG/DL — SIGNIFICANT CHANGE UP (ref 70–99)
HCT VFR BLD CALC: 37.8 % — LOW (ref 39–50)
HGB BLD-MCNC: 12.7 G/DL — LOW (ref 13–17)
IMM GRANULOCYTES NFR BLD AUTO: 0.2 % — SIGNIFICANT CHANGE UP (ref 0–1.5)
LITHIUM SERPL-MCNC: 0.39 MMOL/L — LOW (ref 0.6–1.2)
LYMPHOCYTES # BLD AUTO: 2.93 K/UL — SIGNIFICANT CHANGE UP (ref 1–3.3)
LYMPHOCYTES # BLD AUTO: 31.7 % — SIGNIFICANT CHANGE UP (ref 13–44)
MCHC RBC-ENTMCNC: 28.2 PG — SIGNIFICANT CHANGE UP (ref 27–34)
MCHC RBC-ENTMCNC: 33.6 GM/DL — SIGNIFICANT CHANGE UP (ref 32–36)
MCV RBC AUTO: 84 FL — SIGNIFICANT CHANGE UP (ref 80–100)
METHADONE UR-MCNC: NEGATIVE — SIGNIFICANT CHANGE UP
MONOCYTES # BLD AUTO: 0.55 K/UL — SIGNIFICANT CHANGE UP (ref 0–0.9)
MONOCYTES NFR BLD AUTO: 5.9 % — SIGNIFICANT CHANGE UP (ref 2–14)
NEUTROPHILS # BLD AUTO: 5.45 K/UL — SIGNIFICANT CHANGE UP (ref 1.8–7.4)
NEUTROPHILS NFR BLD AUTO: 59 % — SIGNIFICANT CHANGE UP (ref 43–77)
NRBC # BLD: 0 /100 WBCS — SIGNIFICANT CHANGE UP (ref 0–0)
OPIATES UR-MCNC: NEGATIVE — SIGNIFICANT CHANGE UP
PCP SPEC-MCNC: SIGNIFICANT CHANGE UP
PCP UR-MCNC: NEGATIVE — SIGNIFICANT CHANGE UP
PLATELET # BLD AUTO: 281 K/UL — SIGNIFICANT CHANGE UP (ref 150–400)
POTASSIUM SERPL-MCNC: 3.1 MMOL/L — LOW (ref 3.5–5.3)
POTASSIUM SERPL-SCNC: 3.1 MMOL/L — LOW (ref 3.5–5.3)
PROT SERPL-MCNC: 7.4 G/DL — SIGNIFICANT CHANGE UP (ref 6–8.3)
RBC # BLD: 4.5 M/UL — SIGNIFICANT CHANGE UP (ref 4.2–5.8)
RBC # FLD: 15.1 % — HIGH (ref 10.3–14.5)
SALICYLATES SERPL-MCNC: <0.3 MG/DL — LOW (ref 2.8–20)
SARS-COV-2 RNA SPEC QL NAA+PROBE: NEGATIVE — SIGNIFICANT CHANGE UP
SODIUM SERPL-SCNC: 139 MMOL/L — SIGNIFICANT CHANGE UP (ref 135–145)
THC UR QL: NEGATIVE — SIGNIFICANT CHANGE UP
WBC # BLD: 9.25 K/UL — SIGNIFICANT CHANGE UP (ref 3.8–10.5)
WBC # FLD AUTO: 9.25 K/UL — SIGNIFICANT CHANGE UP (ref 3.8–10.5)

## 2022-07-09 PROCEDURE — 36415 COLL VENOUS BLD VENIPUNCTURE: CPT

## 2022-07-09 PROCEDURE — 85025 COMPLETE CBC W/AUTO DIFF WBC: CPT

## 2022-07-09 PROCEDURE — 99284 EMERGENCY DEPT VISIT MOD MDM: CPT

## 2022-07-09 PROCEDURE — 87635 SARS-COV-2 COVID-19 AMP PRB: CPT

## 2022-07-09 PROCEDURE — 90792 PSYCH DIAG EVAL W/MED SRVCS: CPT | Mod: 95

## 2022-07-09 PROCEDURE — 80178 ASSAY OF LITHIUM: CPT

## 2022-07-09 PROCEDURE — 80053 COMPREHEN METABOLIC PANEL: CPT

## 2022-07-09 PROCEDURE — 93005 ELECTROCARDIOGRAM TRACING: CPT

## 2022-07-09 PROCEDURE — 80307 DRUG TEST PRSMV CHEM ANLYZR: CPT

## 2022-07-09 RX ORDER — LITHIUM CARBONATE 300 MG/1
450 TABLET, EXTENDED RELEASE ORAL ONCE
Refills: 0 | Status: DISCONTINUED | OUTPATIENT
Start: 2022-07-09 | End: 2022-07-09

## 2022-07-09 RX ORDER — ARIPIPRAZOLE 15 MG/1
1 TABLET ORAL
Qty: 0 | Refills: 0 | DISCHARGE

## 2022-07-09 RX ORDER — RISPERIDONE 4 MG/1
1 TABLET ORAL
Qty: 14 | Refills: 0
Start: 2022-07-09 | End: 2022-07-15

## 2022-07-09 RX ORDER — RISPERIDONE 4 MG/1
1 TABLET ORAL ONCE
Refills: 0 | Status: COMPLETED | OUTPATIENT
Start: 2022-07-09 | End: 2022-07-09

## 2022-07-09 RX ORDER — POTASSIUM CHLORIDE 20 MEQ
40 PACKET (EA) ORAL ONCE
Refills: 0 | Status: COMPLETED | OUTPATIENT
Start: 2022-07-09 | End: 2022-07-09

## 2022-07-09 RX ORDER — LITHIUM CARBONATE 300 MG/1
450 TABLET, EXTENDED RELEASE ORAL ONCE
Refills: 0 | Status: COMPLETED | OUTPATIENT
Start: 2022-07-09 | End: 2022-07-09

## 2022-07-09 RX ADMIN — RISPERIDONE 1 MILLIGRAM(S): 4 TABLET ORAL at 07:31

## 2022-07-09 RX ADMIN — LITHIUM CARBONATE 450 MILLIGRAM(S): 300 TABLET, EXTENDED RELEASE ORAL at 07:31

## 2022-07-09 RX ADMIN — Medication 40 MILLIEQUIVALENT(S): at 02:12

## 2022-07-09 NOTE — ED ADULT NURSE REASSESSMENT NOTE - NS ED NURSE REASSESS COMMENT FT1
pt received from previous RN. pt laying in stretcher, RR easy, even, un-labored on room air. 1:1 bedside.

## 2022-07-09 NOTE — ED BEHAVIORAL HEALTH ASSESSMENT NOTE - PSYCHIATRIC ISSUES AND PLAN (INCLUDE STANDING AND PRN MEDICATION)
please give risperdal 1mg PO and lithium 450mg PO x 1 now. For agitation, haldol 5mg with ativan 2mg and benadryl 50mg PO or IM if severe

## 2022-07-09 NOTE — ED ADULT NURSE NOTE - CAS EDN DISCHARGE ASSESSMENT
cleared by psych. 1:1 d/c/Alert and oriented to person, place and time/Patient baseline mental status/Awake

## 2022-07-09 NOTE — ED BEHAVIORAL HEALTH NOTE - BEHAVIORAL HEALTH NOTE
===================       PRE-HOSPITAL COURSE       ===================       SOURCE:  Secondhand EMR documentation.        DETAILS:  Patient presents self to ED; chief complaint of SI/HI.     ============       ED COURSE:       ============       SOURCE:  PA and secondhand EMR documentation.        ARRIVAL:  Patient was cooperative with hospital protocol and allowed for gowning/wanding without incident. Patient presents with good hygiene/grooming. Patient placed on 1:1 In private room for consult.        BELONGINGS:  None notable.       BEHAVIOR: Blood/urine provided for routine labs without noted incident. Patient endorsed nonspecific SI/HI per triage ED documentation, no AH/VH noted, patient not presently endorsing psychiatric symptoms. Patient is AOx4 and makes eye contact; speech of normal volume/rate accompanied by a logical thought process. Patient has been resting in hospital bed, observed to be talking to 1:1 at bedside.     TREATMENT: Patient did not require medication intervention while in ED; has been in behavioral control.     VISITORS:  Patient presently unaccompanied by social supports while in ED.            COVID Exposure Screen- collateral (i.e. third-party, chart review, belongings, etc; include EMS and ED staff)      1. *Has the patient been tested for COVID-19 in the last 90 days? ( X) Yes ( ) No ( ) Unknown- Reason: _____       IF YES PROCEED TO QUESTION #2. IF NO OR UNKNOWN, PLEASE SKIP TO QUESTION #3.       2. Date of test(s), type of test(s), result(s) for ALL tests in last 90 days: ____Negative while in ED____      3. *Has the patient received a COVID-19 vaccine? (  ) Yes ( ) No (X) Unknown- Reason: _____       IF YES PROCEED TO QUESTION #4. IF NO or UNKNOWN, PLEASE SKIP TO QUESTION #7.       4. Moderna ( ) Pfizer ( ) J&J ( )        5. Number of doses: ________       6. Date of last dose: ________       7. *In the past 10 days, has the patient been around anyone with a positive COVID-19 test?* ( ) Yes ( ) No (X) Unknown- Reason: ____       IF YES PLEASE ANSWER THE FOLLOWING QUESTIONS:       8. Was the patient within 6 feet of them for at least 15 minutes? ( ) Yes ( ) No ( ) Unknown- Reason: _____       9. Did the patient provide care for them? ( ) Yes ( ) No ( ) Unknown- Reason: ______       10. Did the patient have direct physical contact with them (touched, hugged, or kissed them)? ( ) Yes ( ) No ( ) Unknown- Reason: __       11. Did the patient share eating or drinking utensils with them? ( ) Yes ( ) No ( ) Unknown- Reason: ____       12. Did they sneeze, cough, or somehow get respiratory droplets on the patient? ( ) Yes ( ) No ( ) Unknown- Reason: ______ ===================       PRE-HOSPITAL COURSE       ===================       SOURCE:  Secondhand EMR documentation.        DETAILS:  Patient presents self to ED; chief complaint of SI/HI.     ============       ED COURSE:       ============       SOURCE:  PA and secondhand EMR documentation.        ARRIVAL:  Patient was cooperative with hospital protocol and allowed for gowning/wanding without incident. Patient presents with good hygiene/grooming. Patient placed on 1:1 In private room for consult.        BELONGINGS:  None notable.       BEHAVIOR: Blood/urine provided for routine labs without noted incident. Patient endorsed nonspecific SI/HI per triage ED documentation, no AH/VH noted, patient not presently endorsing psychiatric symptoms. Patient did endorse upon triage thoughts of wanting to punch someone however wasn't specific to whom. Patient is AOx4 and makes eye contact; speech of normal volume/rate accompanied by a logical thought process. Patient has been resting in hospital bed, observed to be talking to 1:1 at bedside.     TREATMENT: Patient did not require medication intervention while in ED; has been in behavioral control.     VISITORS:  Patient presently unaccompanied by social supports while in ED.            COVID Exposure Screen- collateral (i.e. third-party, chart review, belongings, etc; include EMS and ED staff)      1. *Has the patient been tested for COVID-19 in the last 90 days? ( X) Yes ( ) No ( ) Unknown- Reason: _____       IF YES PROCEED TO QUESTION #2. IF NO OR UNKNOWN, PLEASE SKIP TO QUESTION #3.       2. Date of test(s), type of test(s), result(s) for ALL tests in last 90 days: ____Negative while in ED____      3. *Has the patient received a COVID-19 vaccine? (  ) Yes ( ) No (X) Unknown- Reason: _____       IF YES PROCEED TO QUESTION #4. IF NO or UNKNOWN, PLEASE SKIP TO QUESTION #7.       4. Moderna ( ) Pfizer ( ) J&J ( )        5. Number of doses: ________       6. Date of last dose: ________       7. *In the past 10 days, has the patient been around anyone with a positive COVID-19 test?* ( ) Yes ( ) No (X) Unknown- Reason: ____       IF YES PLEASE ANSWER THE FOLLOWING QUESTIONS:       8. Was the patient within 6 feet of them for at least 15 minutes? ( ) Yes ( ) No ( ) Unknown- Reason: _____       9. Did the patient provide care for them? ( ) Yes ( ) No ( ) Unknown- Reason: ______       10. Did the patient have direct physical contact with them (touched, hugged, or kissed them)? ( ) Yes ( ) No ( ) Unknown- Reason: __       11. Did the patient share eating or drinking utensils with them? ( ) Yes ( ) No ( ) Unknown- Reason: ____       12. Did they sneeze, cough, or somehow get respiratory droplets on the patient? ( ) Yes ( ) No ( ) Unknown- Reason: ______ ===================       PRE-HOSPITAL COURSE       ===================       SOURCE:  Secondhand EMR documentation.        DETAILS:  Patient presents self to ED; chief complaint of SI/HI.     ============       ED COURSE:       ============       SOURCE:  PA and secondhand EMR documentation.        ARRIVAL:  Patient was cooperative with hospital protocol and allowed for gowning/wanding without incident. Patient presents with good hygiene/grooming. Patient placed on 1:1 In private room for consult.        BELONGINGS:  None notable.       BEHAVIOR: Blood/urine provided for routine labs without noted incident. Patient endorsed nonspecific SI/HI per triage ED documentation, no AH/VH noted, patient not presently endorsing psychiatric symptoms. Patient did endorse upon triage thoughts of wanting to punch someone however wasn't specific to whom. Patient is AOx4 and makes eye contact; speech of normal volume/rate accompanied by a logical thought process. Patient has been resting in hospital bed, observed to be talking to 1:1 at bedside.     TREATMENT: Patient did not require medication intervention while in ED; has been in behavioral control.     VISITORS:  Patient presently unaccompanied by social supports while in ED.            COVID Exposure Screen- collateral (i.e. third-party, chart review, belongings, etc; include EMS and ED staff)      1. *Has the patient been tested for COVID-19 in the last 90 days? ( X) Yes ( ) No ( ) Unknown- Reason: _____       IF YES PROCEED TO QUESTION #2. IF NO OR UNKNOWN, PLEASE SKIP TO QUESTION #3.       2. Date of test(s), type of test(s), result(s) for ALL tests in last 90 days: ____Negative while in ED____      3. *Has the patient received a COVID-19 vaccine? (  ) Yes ( ) No (X) Unknown- Reason: _____       IF YES PROCEED TO QUESTION #4. IF NO or UNKNOWN, PLEASE SKIP TO QUESTION #7.       4. Moderna ( ) Pfizer ( ) J&J ( )        5. Number of doses: ________       6. Date of last dose: ________       7. *In the past 10 days, has the patient been around anyone with a positive COVID-19 test?* ( ) Yes ( ) No (X) Unknown- Reason: ____       IF YES PLEASE ANSWER THE FOLLOWING QUESTIONS:       8. Was the patient within 6 feet of them for at least 15 minutes? ( ) Yes ( ) No ( ) Unknown- Reason: _____       9. Did the patient provide care for them? ( ) Yes ( ) No ( ) Unknown- Reason: ______       10. Did the patient have direct physical contact with them (touched, hugged, or kissed them)? ( ) Yes ( ) No ( ) Unknown- Reason: __       11. Did the patient share eating or drinking utensils with them? ( ) Yes ( ) No ( ) Unknown- Reason: ____       12. Did they sneeze, cough, or somehow get respiratory droplets on the patient? ( ) Yes ( ) No ( ) Unknown- Reason: ______    Writer left message for patient's grandmother Meli [phone] requesting callback.  Writer left message for patient's Aunt Jose [phone] requesting callback. ===================       PRE-HOSPITAL COURSE       ===================       SOURCE:  Secondhand EMR documentation.        DETAILS:  Patient presents self to ED; chief complaint of SI/HI.     ============       ED COURSE:       ============       SOURCE:  PA and secondhand EMR documentation.        ARRIVAL:  Patient was cooperative with hospital protocol and allowed for gowning/wanding without incident. Patient presents with good hygiene/grooming. Patient placed on 1:1 In private room for consult.        BELONGINGS:  None notable.       BEHAVIOR: Blood/urine provided for routine labs without noted incident. Patient endorsed nonspecific SI/HI per triage ED documentation, no AH/VH noted, patient not presently endorsing psychiatric symptoms. Patient did endorse upon triage thoughts of wanting to punch someone however wasn't specific to whom. Patient is AOx4 and makes eye contact; speech of normal volume/rate accompanied by a logical thought process. Patient has been resting in hospital bed, observed to be talking to 1:1 at bedside.     TREATMENT: Patient did not require medication intervention while in ED; has been in behavioral control.     VISITORS:  Patient presently unaccompanied by social supports while in ED.            COVID Exposure Screen- collateral (i.e. third-party, chart review, belongings, etc; include EMS and ED staff)      1. *Has the patient been tested for COVID-19 in the last 90 days? ( X) Yes ( ) No ( ) Unknown- Reason: _____       IF YES PROCEED TO QUESTION #2. IF NO OR UNKNOWN, PLEASE SKIP TO QUESTION #3.       2. Date of test(s), type of test(s), result(s) for ALL tests in last 90 days: ____Negative while in ED____      3. *Has the patient received a COVID-19 vaccine? (  ) Yes ( ) No (X) Unknown- Reason: _____       IF YES PROCEED TO QUESTION #4. IF NO or UNKNOWN, PLEASE SKIP TO QUESTION #7.       4. Moderna ( ) Pfizer ( ) J&J ( )        5. Number of doses: ________       6. Date of last dose: ________       7. *In the past 10 days, has the patient been around anyone with a positive COVID-19 test?* ( ) Yes ( ) No (X) Unknown- Reason: ____       IF YES PLEASE ANSWER THE FOLLOWING QUESTIONS:       8. Was the patient within 6 feet of them for at least 15 minutes? ( ) Yes ( ) No ( ) Unknown- Reason: _____       9. Did the patient provide care for them? ( ) Yes ( ) No ( ) Unknown- Reason: ______       10. Did the patient have direct physical contact with them (touched, hugged, or kissed them)? ( ) Yes ( ) No ( ) Unknown- Reason: __       11. Did the patient share eating or drinking utensils with them? ( ) Yes ( ) No ( ) Unknown- Reason: ____       12. Did they sneeze, cough, or somehow get respiratory droplets on the patient? ( ) Yes ( ) No ( ) Unknown- Reason: ______    Writer left message for patient's grandmother Meli [phone] requesting callback.  Writer left message for patient's Aunt Jose [phone] requesting callback.    Writer spoke to patient's Aunt Jose whom returned call; reliable historian, aware patient was going to present to ED.     Collateral endorses patient is presently domiciled with grandparents; states that he was residing with her since he was a young child however due to disrespecting boundaries in the home he was asked to leave; reports he has been staying intermittently with grandparents or hospitalized/staying out at unknown location. Collateral endorses patient at baseline able to attend to ADL's however is aware of difficulty sleeping. Collateral reports she is aware of ADD/Mood disorder diagnosis however unable to provide specifics. Collateral endorses history of medication/treatment noncompliance as well as multiple IPP's; endorses patient was recently released from [facility] or [place] two weeks ago. Collateral reports for the past few months patient has been behaving oddly, defiant, knows he has been drinking and smoking marijuana as well as disappearing without telling family of his whereabouts. Reports during this past hospitalization patient did endorse SI however denies knowledge of patient acting upon it. Collateral does report patient has history of being on 1:1 on the unit for making threats against himself as well as other  people, endorses history of aggression towards people. Collateral also endorses reported hallucinations. Reports patient has an upcoming appointment on Thursday at [facility], is aware he was prescribed lithium and was going to inquire about a medication to aid in sleeplessness. Reports that patient had endorsed to father when confronted he doesn't take prescribed medications due to nausea.   Collateral endorses she was told by patient's father he stated he did not feel good and was going to go to hospital; provides Dad's phone number, Jose Luis [phone] for further collateral information.
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
## 2022-07-09 NOTE — ED BEHAVIORAL HEALTH ASSESSMENT NOTE - OTHER PAST PSYCHIATRIC HISTORY (INCLUDE DETAILS REGARDING ONSET, COURSE OF ILLNESS, INPATIENT/OUTPATIENT TREATMENT)
reports around 5 prior hospitalizations, possibly another 5 ED visits not resulting in admissions (not confirmed), last hosp was [facility]

## 2022-07-09 NOTE — ED BEHAVIORAL HEALTH ASSESSMENT NOTE - NS ED BHA HOMICIDALITY PRESENT CURRENT PLAN
Patient seen for OT evaluation and 1 visit, with patient not needing further therapy and did not return for follow up as she wanted more therapy.    Discharge OT at this time due to goals met, but patient requesting more sessions, but did not schedule more.    
Yes

## 2022-07-09 NOTE — ED PROVIDER NOTE - OBJECTIVE STATEMENT
21 y/o M with a PMHX of bipolar disorder presents to the ED c/o feeling SI, HI with uncontrollable aggression and anxiety stating that he wants to punch something or someone and requesting to be seen by a psychiatrist for evaluation. Pt denies any illicit drug usage or EtOH intake. Pt reports he is getting IM injections and next dose on July 25th and states that he cant wait until then prompting visit to ED for evaluation. Pt has no other complaints.

## 2022-07-09 NOTE — ED ADULT NURSE NOTE - DISCHARGE DATE/TIME
If you are a smoker, it is important for your health to stop smoking. Please be aware that second hand smoke is also harmful. 09-Jul-2022 11:12

## 2022-07-09 NOTE — ED PROVIDER NOTE - NSFOLLOWUPINSTRUCTIONS_ED_ALL_ED_FT
Continue risperdal as prescribed    Follow up with your psychiatrist on Thursday as Nicole as scheduled

## 2022-07-09 NOTE — ED PROVIDER NOTE - CLINICAL SUMMARY MEDICAL DECISION MAKING FREE TEXT BOX
21 y/o M with a pmhx of bipolar disorder presents to the ED c/o anxiety and uncontrollable aggression. Pt is in no acute distress and want to speak to psychiatry and is c/o SI and HI thoughts. Pt reports taking no illicit drugs or EtOH intake. Pending labs and pending psych evaluation. Dispo pending workup and will reassess.

## 2022-07-09 NOTE — ED PROVIDER NOTE - ATTENDING APP SHARED VISIT CONTRIBUTION OF CARE
schizoaffective with si/aggression. wants to punch someone. has psychiatrist, reports compliant with meds but not working. vitals stable, well appearing. medical screening labs obtained. psych consulted. signed out to Dr. Chan pending dispo
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
## 2022-07-09 NOTE — ED PROVIDER NOTE - NS ED ATTENDING STATEMENT MOD
This was a shared visit with the CHACHA. I reviewed and verified the documentation and independently performed the documented:

## 2022-07-09 NOTE — ED BEHAVIORAL HEALTH ASSESSMENT NOTE - DESCRIPTION
unemployed, living w grandparents. in previous notes he had been living w aunt and uncle or in respite care See BTCM note Denies

## 2022-07-09 NOTE — ED BEHAVIORAL HEALTH ASSESSMENT NOTE - DETAILS
denies that there is anyone particular that he wants to hurt or kill, states that he just always gets angry and gets into fights Reports that he was admitted to [facility] for 2-3 weeks, discharged a week ago reports mother with schizophrenia Patient unable or unwilling to provide number for father. Messages left with grandmother and aunt Discussed with ED attending Would not impact clinical decisionmaking at this time See HPI Called [facility] at [phone] but no physician available to assist
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
## 2022-07-09 NOTE — ED ADULT NURSE NOTE - OBJECTIVE STATEMENT
22 M, wHx of Bipolar self ambulates to Ed c/o  SI, HI  , increasing anxiety and for psych evaluation. Patient  verbalized  active plan plan " just wanna punch myself or others.  Patient is cooperative , good eye contact / hygience . Patient denies medical complaints .

## 2022-07-09 NOTE — ED PROVIDER NOTE - PATIENT PORTAL LINK FT
You can access the FollowMyHealth Patient Portal offered by [facility] by registering at the following website: [email or web address]. By joining [employer]’s FollowMyHealth portal, you will also be able to view your health information using other applications (apps) compatible with our system.

## 2022-08-13 ENCOUNTER — EMERGENCY (EMERGENCY)
Facility: HOSPITAL | Age: 22
LOS: 1 days | Discharge: HOME | End: 2022-08-13
Attending: EMERGENCY MEDICINE | Admitting: EMERGENCY MEDICINE

## 2022-08-13 VITALS
OXYGEN SATURATION: 98 % | HEART RATE: 77 BPM | DIASTOLIC BLOOD PRESSURE: 71 MMHG | RESPIRATION RATE: 16 BRPM | TEMPERATURE: 98 F | SYSTOLIC BLOOD PRESSURE: 126 MMHG | HEIGHT: 68 IN

## 2022-08-13 VITALS
RESPIRATION RATE: 15 BRPM | SYSTOLIC BLOOD PRESSURE: 145 MMHG | OXYGEN SATURATION: 100 % | HEART RATE: 69 BPM | TEMPERATURE: 98 F | DIASTOLIC BLOOD PRESSURE: 77 MMHG

## 2022-08-13 DIAGNOSIS — F25.0 SCHIZOAFFECTIVE DISORDER, BIPOLAR TYPE: ICD-10-CM

## 2022-08-13 DIAGNOSIS — F60.89 OTHER SPECIFIC PERSONALITY DISORDERS: ICD-10-CM

## 2022-08-13 DIAGNOSIS — F31.32 BIPOLAR DISORDER, CURRENT EPISODE DEPRESSED, MODERATE: ICD-10-CM

## 2022-08-13 LAB
ALBUMIN SERPL ELPH-MCNC: 3.9 G/DL — SIGNIFICANT CHANGE UP (ref 3.3–5)
ALP SERPL-CCNC: 68 U/L — SIGNIFICANT CHANGE UP (ref 40–120)
ALT FLD-CCNC: 17 U/L — SIGNIFICANT CHANGE UP (ref 4–41)
AMPHET UR-MCNC: NEGATIVE — SIGNIFICANT CHANGE UP
ANION GAP SERPL CALC-SCNC: 12 MMOL/L — SIGNIFICANT CHANGE UP (ref 7–14)
APAP SERPL-MCNC: <10 UG/ML — LOW (ref 15–25)
APPEARANCE UR: CLEAR — SIGNIFICANT CHANGE UP
AST SERPL-CCNC: 16 U/L — SIGNIFICANT CHANGE UP (ref 4–40)
B PERT DNA SPEC QL NAA+PROBE: SIGNIFICANT CHANGE UP
B PERT+PARAPERT DNA PNL SPEC NAA+PROBE: SIGNIFICANT CHANGE UP
BACTERIA # UR AUTO: NEGATIVE — SIGNIFICANT CHANGE UP
BARBITURATES UR SCN-MCNC: NEGATIVE — SIGNIFICANT CHANGE UP
BASOPHILS # BLD AUTO: 0.04 K/UL — SIGNIFICANT CHANGE UP (ref 0–0.2)
BASOPHILS NFR BLD AUTO: 0.5 % — SIGNIFICANT CHANGE UP (ref 0–2)
BENZODIAZ UR-MCNC: NEGATIVE — SIGNIFICANT CHANGE UP
BILIRUB SERPL-MCNC: 0.3 MG/DL — SIGNIFICANT CHANGE UP (ref 0.2–1.2)
BILIRUB UR-MCNC: NEGATIVE — SIGNIFICANT CHANGE UP
BORDETELLA PARAPERTUSSIS (RAPRVP): SIGNIFICANT CHANGE UP
BUN SERPL-MCNC: 5 MG/DL — LOW (ref 7–23)
C PNEUM DNA SPEC QL NAA+PROBE: SIGNIFICANT CHANGE UP
CALCIUM SERPL-MCNC: 9.4 MG/DL — SIGNIFICANT CHANGE UP (ref 8.4–10.5)
CHLORIDE SERPL-SCNC: 104 MMOL/L — SIGNIFICANT CHANGE UP (ref 98–107)
CO2 SERPL-SCNC: 26 MMOL/L — SIGNIFICANT CHANGE UP (ref 22–31)
COCAINE METAB.OTHER UR-MCNC: NEGATIVE — SIGNIFICANT CHANGE UP
COLOR SPEC: YELLOW — SIGNIFICANT CHANGE UP
COVID-19 SPIKE DOMAIN AB INTERP: POSITIVE
COVID-19 SPIKE DOMAIN ANTIBODY RESULT: >250 U/ML — HIGH
CREAT SERPL-MCNC: 0.72 MG/DL — SIGNIFICANT CHANGE UP (ref 0.5–1.3)
CREATININE URINE RESULT, DAU: 375 MG/DL — SIGNIFICANT CHANGE UP
DIFF PNL FLD: NEGATIVE — SIGNIFICANT CHANGE UP
EGFR: 132 ML/MIN/1.73M2 — SIGNIFICANT CHANGE UP
EOSINOPHIL # BLD AUTO: 0.15 K/UL — SIGNIFICANT CHANGE UP (ref 0–0.5)
EOSINOPHIL NFR BLD AUTO: 2 % — SIGNIFICANT CHANGE UP (ref 0–6)
EPI CELLS # UR: 2 /HPF — SIGNIFICANT CHANGE UP (ref 0–5)
ETHANOL SERPL-MCNC: <10 MG/DL — SIGNIFICANT CHANGE UP
FLUAV SUBTYP SPEC NAA+PROBE: SIGNIFICANT CHANGE UP
FLUBV RNA SPEC QL NAA+PROBE: SIGNIFICANT CHANGE UP
GLUCOSE SERPL-MCNC: 83 MG/DL — SIGNIFICANT CHANGE UP (ref 70–99)
GLUCOSE UR QL: NEGATIVE — SIGNIFICANT CHANGE UP
HADV DNA SPEC QL NAA+PROBE: SIGNIFICANT CHANGE UP
HCOV 229E RNA SPEC QL NAA+PROBE: SIGNIFICANT CHANGE UP
HCOV HKU1 RNA SPEC QL NAA+PROBE: SIGNIFICANT CHANGE UP
HCOV NL63 RNA SPEC QL NAA+PROBE: SIGNIFICANT CHANGE UP
HCOV OC43 RNA SPEC QL NAA+PROBE: SIGNIFICANT CHANGE UP
HCT VFR BLD CALC: 40.1 % — SIGNIFICANT CHANGE UP (ref 39–50)
HGB BLD-MCNC: 13.1 G/DL — SIGNIFICANT CHANGE UP (ref 13–17)
HMPV RNA SPEC QL NAA+PROBE: SIGNIFICANT CHANGE UP
HPIV1 RNA SPEC QL NAA+PROBE: SIGNIFICANT CHANGE UP
HPIV2 RNA SPEC QL NAA+PROBE: SIGNIFICANT CHANGE UP
HPIV3 RNA SPEC QL NAA+PROBE: SIGNIFICANT CHANGE UP
HPIV4 RNA SPEC QL NAA+PROBE: SIGNIFICANT CHANGE UP
HYALINE CASTS # UR AUTO: 1 /LPF — SIGNIFICANT CHANGE UP (ref 0–7)
IANC: 4.33 K/UL — SIGNIFICANT CHANGE UP (ref 1.8–7.4)
IMM GRANULOCYTES NFR BLD AUTO: 0.4 % — SIGNIFICANT CHANGE UP (ref 0–1.5)
KETONES UR-MCNC: NEGATIVE — SIGNIFICANT CHANGE UP
LEUKOCYTE ESTERASE UR-ACNC: NEGATIVE — SIGNIFICANT CHANGE UP
LYMPHOCYTES # BLD AUTO: 2.16 K/UL — SIGNIFICANT CHANGE UP (ref 1–3.3)
LYMPHOCYTES # BLD AUTO: 28.9 % — SIGNIFICANT CHANGE UP (ref 13–44)
M PNEUMO DNA SPEC QL NAA+PROBE: SIGNIFICANT CHANGE UP
MCHC RBC-ENTMCNC: 27.5 PG — SIGNIFICANT CHANGE UP (ref 27–34)
MCHC RBC-ENTMCNC: 32.7 GM/DL — SIGNIFICANT CHANGE UP (ref 32–36)
MCV RBC AUTO: 84.2 FL — SIGNIFICANT CHANGE UP (ref 80–100)
METHADONE UR-MCNC: NEGATIVE — SIGNIFICANT CHANGE UP
MONOCYTES # BLD AUTO: 0.76 K/UL — SIGNIFICANT CHANGE UP (ref 0–0.9)
MONOCYTES NFR BLD AUTO: 10.2 % — SIGNIFICANT CHANGE UP (ref 2–14)
NEUTROPHILS # BLD AUTO: 4.33 K/UL — SIGNIFICANT CHANGE UP (ref 1.8–7.4)
NEUTROPHILS NFR BLD AUTO: 58 % — SIGNIFICANT CHANGE UP (ref 43–77)
NITRITE UR-MCNC: NEGATIVE — SIGNIFICANT CHANGE UP
NRBC # BLD: 0 /100 WBCS — SIGNIFICANT CHANGE UP
NRBC # FLD: 0 K/UL — SIGNIFICANT CHANGE UP
OPIATES UR-MCNC: NEGATIVE — SIGNIFICANT CHANGE UP
OXYCODONE UR-MCNC: NEGATIVE — SIGNIFICANT CHANGE UP
PCP SPEC-MCNC: SIGNIFICANT CHANGE UP
PCP UR-MCNC: NEGATIVE — SIGNIFICANT CHANGE UP
PH UR: 6 — SIGNIFICANT CHANGE UP (ref 5–8)
PLATELET # BLD AUTO: 214 K/UL — SIGNIFICANT CHANGE UP (ref 150–400)
POTASSIUM SERPL-MCNC: 3.6 MMOL/L — SIGNIFICANT CHANGE UP (ref 3.5–5.3)
POTASSIUM SERPL-SCNC: 3.6 MMOL/L — SIGNIFICANT CHANGE UP (ref 3.5–5.3)
PROT SERPL-MCNC: 6.8 G/DL — SIGNIFICANT CHANGE UP (ref 6–8.3)
PROT UR-MCNC: ABNORMAL
RAPID RVP RESULT: SIGNIFICANT CHANGE UP
RBC # BLD: 4.76 M/UL — SIGNIFICANT CHANGE UP (ref 4.2–5.8)
RBC # FLD: 14.3 % — SIGNIFICANT CHANGE UP (ref 10.3–14.5)
RBC CASTS # UR COMP ASSIST: 3 /HPF — SIGNIFICANT CHANGE UP (ref 0–4)
RSV RNA SPEC QL NAA+PROBE: SIGNIFICANT CHANGE UP
RV+EV RNA SPEC QL NAA+PROBE: SIGNIFICANT CHANGE UP
SALICYLATES SERPL-MCNC: <0.3 MG/DL — LOW (ref 15–30)
SARS-COV-2 IGG+IGM SERPL QL IA: >250 U/ML — HIGH
SARS-COV-2 IGG+IGM SERPL QL IA: POSITIVE
SARS-COV-2 RNA SPEC QL NAA+PROBE: SIGNIFICANT CHANGE UP
SODIUM SERPL-SCNC: 142 MMOL/L — SIGNIFICANT CHANGE UP (ref 135–145)
SP GR SPEC: 1.03 — SIGNIFICANT CHANGE UP (ref 1.01–1.05)
THC UR QL: POSITIVE
TOXICOLOGY SCREEN, DRUGS OF ABUSE, SERUM RESULT: SIGNIFICANT CHANGE UP
TSH SERPL-MCNC: 1.44 UIU/ML — SIGNIFICANT CHANGE UP (ref 0.27–4.2)
UROBILINOGEN FLD QL: SIGNIFICANT CHANGE UP
WBC # BLD: 7.47 K/UL — SIGNIFICANT CHANGE UP (ref 3.8–10.5)
WBC # FLD AUTO: 7.47 K/UL — SIGNIFICANT CHANGE UP (ref 3.8–10.5)
WBC UR QL: 3 /HPF — SIGNIFICANT CHANGE UP (ref 0–5)

## 2022-08-13 PROCEDURE — 99284 EMERGENCY DEPT VISIT MOD MDM: CPT

## 2022-08-13 NOTE — ED BEHAVIORAL HEALTH ASSESSMENT NOTE - RISK ASSESSMENT
Moderate Acute Suicide Risk Chronic: hx of prior hospitalizations, dx of affective vs psychotic/affective process, past SI/SA   Acute: depressive sx, substance use, SI/aggression  Protective: help-seeking, reported domicile Low Acute Suicide Risk

## 2022-08-13 NOTE — ED PROVIDER NOTE - PATIENT PORTAL LINK FT
You can access the FollowMyHealth Patient Portal offered by [facility] by registering at the following website: [email or web address]. By joining [employer]’s FollowMyHealth portal, you will also be able to view your health information using other applications (apps) compatible with our system.

## 2022-08-13 NOTE — ED BEHAVIORAL HEALTH NOTE - BEHAVIORAL HEALTH NOTE
Per medical team, plan is to admit pt.  Faxed clinicals to [facility] but they are unable to accept him.  [facility] has no male bed at this time..  HENRI faxed clinicals to [employer] for authorization. HENRI to follow.

## 2022-08-13 NOTE — ED PROVIDER NOTE - OBJECTIVE STATEMENT
22 yom states he has schizoaffective disorder, bipolar disorder, depression and mood disorder. Pt states that in the past he has tried to commit suicide by "punching myself in the face" and "falling down." Pt states he has SI, no HI and he believes he will follow through. Pt was last hospitalized 3 months ago and seen at OSH and signed out recently.
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
## 2022-08-13 NOTE — ED BEHAVIORAL HEALTH ASSESSMENT NOTE - DIFFERENTIAL
substance induced affective process vs exacerbation of historic SZA vs Bipolar disorder, would consider personality component given seemingly chronic nature of SI per chart review with elements related to affective instability, irritability, and difficulty with relationships. exacerbation of historic SZA vs Bipolar disorder, would consider personality component given seemingly chronic nature of SI per chart review with elements related to affective instability, irritability, and difficulty with relationships vs substance induced affective process exacerbation of historic SZA vs Bipolar disorder, would consider personality component given seemingly chronic nature of SI per chart review with elements related to affective instability, irritability, and difficulty with relationships vs substance induced affective process vs secondary gain

## 2022-08-13 NOTE — ED ADULT NURSE NOTE - ED STAT RN HANDOFF DETAILS
calm and cooperative, answers to questions appropriately, tolerated a tray of food 90%. Dc home per psych lito [facility] paper given and reviewed, metro card provided to pt.

## 2022-08-13 NOTE — ED BEHAVIORAL HEALTH ASSESSMENT NOTE - SUBSTANCE ISSUES AND PLAN (INCLUDE STANDING AND PRN MEDICATION)
denies opioid, ETOH use, utox sig only for THC denies opioid, ETOH use, utox sig only for THC, could consider N-Acetyl Cysteine 600 mg BID titrated to 1200 mg BID for THC cravings (mixed literature on efficacy in younger adults)

## 2022-08-13 NOTE — ED BEHAVIORAL HEALTH ASSESSMENT NOTE - REFERRAL / APPOINTMENT DETAILS
Patient can call [phone] for Crisis Center to set up a telehealth appt to connect him to treatment, will need travel assistance to get back home

## 2022-08-13 NOTE — ED BEHAVIORAL HEALTH ASSESSMENT NOTE - DESCRIPTION
Denies Lives with grandparents in [place] generally, but stayed with a friend in [place] recently, THC use, unemployed, not in school ICU Vital Signs Last 24 Hrs  T(C): 36.8 (13 Aug 2022 03:02), Max: 36.8 (13 Aug 2022 03:02)  T(F): 98.2 (13 Aug 2022 03:02), Max: 98.2 (13 Aug 2022 03:02)  HR: 77 (13 Aug 2022 03:02) (77 - 77)  BP: 126/71 (13 Aug 2022 03:02) (126/71 - 126/71)  BP(mean): --  ABP: --  ABP(mean): --  RR: 16 (13 Aug 2022 03:02) (16 - 16)  SpO2: 98% (13 Aug 2022 03:02) (98% - 98%)    O2 Parameters below as of 13 Aug 2022 03:02  Patient On (Oxygen Delivery Method): room air Denies (chart history of childhood asthma)

## 2022-08-13 NOTE — ED ADULT NURSE NOTE - OBJECTIVE STATEMENT
Pt received to  accompanied by EMS. Pt presents calm and cooperative; states hx of schizoaffective disorder, non compliant with prescribed Depakote. Pt states that he feels suicidal "since midnight", denies plan or intent. Pt also endorsing vague homicidal ideation but when asked to elaborate, pt declines stating he "doesn't want to talk about it". Pts belongings secured for safety. Pt awaiting further evaluation.

## 2022-08-13 NOTE — ED BEHAVIORAL HEALTH ASSESSMENT NOTE - PAST PSYCHOTROPIC MEDICATION
Depakote (does not recall dosage)  Lithium (states on "high" dose" in the past without benefit)  Abilify (doesn't recall when he was on, but chart history reports) Depakote (does not recall dosage)  Lithium (states on "high" dose" in the past without benefit)  Abilify (doesn't recall when he was on, but chart history with Aristada 1064 mg HARMON in March 2020 dc from [facility]).  Clozapine (was on up to 150 mg qHS on dc from [facility] in March 2020)
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
## 2022-08-13 NOTE — ED BEHAVIORAL HEALTH ASSESSMENT NOTE - DETAILS
Pt with active SI leading to admission with stated method/intent around cutting wrists. At present continues with active SI and denies ability to safety plan self-referred Self-referred Patient with SI prior to evaluation but denied after, described plan of cutting wrist but denies now. Refer to separate chart document self referred

## 2022-08-13 NOTE — ED BEHAVIORAL HEALTH ASSESSMENT NOTE - NSBHATTESTAPPAMEND_PSY_A_CORE
I have personally seen and examined this patient. I fully participated in the care of this patient. I have made amendments to the documentation where appropriate and otherwise agree with the history, physical exam, and plan as documented by the [facility]

## 2022-08-13 NOTE — ED ADULT NURSE REASSESSMENT NOTE - NS ED NURSE REASSESS COMMENT FT1
Received report from night RN pt lying on bed in nad pt c/o depression & si emotional reassurance provided  labs/covid collected pt awaiting bed assignment eval on going.

## 2022-08-13 NOTE — ED BEHAVIORAL HEALTH ASSESSMENT NOTE - PSYCHIATRIC ISSUES AND PLAN (INCLUDE STANDING AND PRN MEDICATION)
patient reports last on Depakote, would consider retrial if patient amenable vs Lamictal vs AP with HARMON potential given pattern of non-adherence. Safety: Haldol/Ativan PRNs patient reports last on Depakote, would consider retrial if patient amenable vs Lamictal vs AP with HARMON potential given pattern of non-adherence. Safety: Haldol 5/Ativan 2 mg PO/IM q6h PRNs

## 2022-08-13 NOTE — BH SAFETY PLAN - LOCAL URGENT CARE ADDRESS
[id number] [place] [address], [facility], First Floor, [place] [address], [facility]

## 2022-08-13 NOTE — ED BEHAVIORAL HEALTH ASSESSMENT NOTE - ADDITIONAL DETAILS ALL
active SI with method/intent stated, denies that he will attempt in hospital setting stating he wants med adjustments for mood/SI. Patient denies present SI now, denies cutting method, denies passive thoughts of dying on reevaluation

## 2022-08-13 NOTE — ED BEHAVIORAL HEALTH ASSESSMENT NOTE - NSPRESENTSXS_PSY_ALL_CORE
Depressed mood/Anhedonia/Hopelessness or despair/Refusal or inability to complete safety plan Depressed mood/Anhedonia
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
## 2022-08-13 NOTE — ED BEHAVIORAL HEALTH ASSESSMENT NOTE - OTHER PAST PSYCHIATRIC HISTORY (INCLUDE DETAILS REGARDING ONSET, COURSE OF ILLNESS, INPATIENT/OUTPATIENT TREATMENT)
Multiple past hospitalizations reported, pattern of non-engagement with outpatient care on discharge,.

## 2022-08-14 NOTE — ED BEHAVIORAL HEALTH NOTE - BEHAVIORAL HEALTH NOTE
HIGH RISK FOLLOW UP: SW contacted pt at [phone] who shared that he is doing good. Pt denies feelings of SI. Pt feels comfortable with safety plan created. Pt aware of crisis clinic if needed. No other SW needs identified at this time.

## 2025-05-13 NOTE — ED ADULT TRIAGE NOTE - NS_BH TRG QUESTION1_ED_ALL_ED
Patient wife called and stated that her  would be a perfect candidate for medical marijuana. Not smoking it but the drops to help with his pain. She would like to know how it works and what needs to be done for the patient to receive the drops.    Requesting a call back.   No